# Patient Record
Sex: FEMALE | Race: WHITE | Employment: FULL TIME | URBAN - METROPOLITAN AREA
[De-identification: names, ages, dates, MRNs, and addresses within clinical notes are randomized per-mention and may not be internally consistent; named-entity substitution may affect disease eponyms.]

---

## 2017-08-29 ENCOUNTER — TELEPHONE (OUTPATIENT)
Dept: SURGERY | Age: 42
End: 2017-08-29

## 2019-07-30 ENCOUNTER — HOSPITAL ENCOUNTER (OUTPATIENT)
Dept: LAB | Age: 44
Discharge: HOME | End: 2019-07-30
Payer: OTHER GOVERNMENT

## 2019-07-30 ENCOUNTER — OFFICE VISIT (OUTPATIENT)
Dept: ONCOLOGY | Age: 44
End: 2019-07-30

## 2019-07-30 VITALS
OXYGEN SATURATION: 95 % | DIASTOLIC BLOOD PRESSURE: 75 MMHG | WEIGHT: 293 LBS | TEMPERATURE: 97.2 F | RESPIRATION RATE: 20 BRPM | SYSTOLIC BLOOD PRESSURE: 118 MMHG | HEART RATE: 86 BPM

## 2019-07-30 DIAGNOSIS — E61.1 IRON DEFICIENCY: ICD-10-CM

## 2019-07-30 DIAGNOSIS — D75.839 THROMBOCYTOSIS: Primary | ICD-10-CM

## 2019-07-30 LAB
ALBUMIN SERPL-MCNC: 3.7 G/DL (ref 3.4–5)
ALBUMIN/GLOB SERPL: 1.1 {RATIO} (ref 0.8–1.7)
ALP SERPL-CCNC: 93 U/L (ref 45–117)
ALT SERPL-CCNC: 34 U/L (ref 13–56)
ANION GAP SERPL CALC-SCNC: 8 MMOL/L (ref 3–18)
AST SERPL-CCNC: 21 U/L (ref 10–38)
BASOPHILS # BLD: 0 K/UL (ref 0–0.1)
BASOPHILS NFR BLD: 0 % (ref 0–2)
BILIRUB SERPL-MCNC: 0.8 MG/DL (ref 0.2–1)
BUN SERPL-MCNC: 18 MG/DL (ref 7–18)
BUN/CREAT SERPL: 18 (ref 12–20)
CALCIUM SERPL-MCNC: 9.3 MG/DL (ref 8.5–10.1)
CHLORIDE SERPL-SCNC: 97 MMOL/L (ref 100–111)
CO2 SERPL-SCNC: 30 MMOL/L (ref 21–32)
CREAT SERPL-MCNC: 0.99 MG/DL (ref 0.6–1.3)
CRP SERPL-MCNC: 2.1 MG/DL (ref 0–0.3)
DIFFERENTIAL METHOD BLD: NORMAL
EOSINOPHIL # BLD: 0.2 K/UL (ref 0–0.4)
EOSINOPHIL NFR BLD: 2 % (ref 0–5)
ERYTHROCYTE [DISTWIDTH] IN BLOOD BY AUTOMATED COUNT: 13.1 % (ref 11.6–14.5)
ERYTHROCYTE [SEDIMENTATION RATE] IN BLOOD: 24 MM/HR (ref 0–20)
FERRITIN SERPL-MCNC: 82 NG/ML (ref 8–388)
GLOBULIN SER CALC-MCNC: 3.5 G/DL (ref 2–4)
GLUCOSE SERPL-MCNC: 107 MG/DL (ref 74–99)
HCT VFR BLD AUTO: 42.7 % (ref 35–45)
HGB BLD-MCNC: 14.1 G/DL (ref 12–16)
IRON SATN MFR SERPL: 13 %
IRON SERPL-MCNC: 61 UG/DL (ref 50–175)
LYMPHOCYTES # BLD: 2.7 K/UL (ref 0.9–3.6)
LYMPHOCYTES NFR BLD: 24 % (ref 21–52)
MCH RBC QN AUTO: 29 PG (ref 24–34)
MCHC RBC AUTO-ENTMCNC: 33 G/DL (ref 31–37)
MCV RBC AUTO: 87.9 FL (ref 74–97)
MONOCYTES # BLD: 0.7 K/UL (ref 0.05–1.2)
MONOCYTES NFR BLD: 6 % (ref 3–10)
NEUTS SEG # BLD: 7.6 K/UL (ref 1.8–8)
NEUTS SEG NFR BLD: 68 % (ref 40–73)
PLATELET # BLD AUTO: 413 K/UL (ref 135–420)
PMV BLD AUTO: 9.6 FL (ref 9.2–11.8)
POTASSIUM SERPL-SCNC: 4.3 MMOL/L (ref 3.5–5.5)
PROT SERPL-MCNC: 7.2 G/DL (ref 6.4–8.2)
RBC # BLD AUTO: 4.86 M/UL (ref 4.2–5.3)
SODIUM SERPL-SCNC: 135 MMOL/L (ref 136–145)
TIBC SERPL-MCNC: 467 UG/DL (ref 250–450)
WBC # BLD AUTO: 11.2 K/UL (ref 4.6–13.2)

## 2019-07-30 PROCEDURE — 36415 COLL VENOUS BLD VENIPUNCTURE: CPT

## 2019-07-30 PROCEDURE — 83540 ASSAY OF IRON: CPT

## 2019-07-30 PROCEDURE — 85652 RBC SED RATE AUTOMATED: CPT

## 2019-07-30 PROCEDURE — 86140 C-REACTIVE PROTEIN: CPT

## 2019-07-30 PROCEDURE — 80053 COMPREHEN METABOLIC PANEL: CPT

## 2019-07-30 PROCEDURE — 81270 JAK2 GENE: CPT

## 2019-07-30 PROCEDURE — 81402 MOPATH PROCEDURE LEVEL 3: CPT

## 2019-07-30 PROCEDURE — 81219 CALR GENE COM VARIANTS: CPT

## 2019-07-30 PROCEDURE — 85025 COMPLETE CBC W/AUTO DIFF WBC: CPT

## 2019-07-30 PROCEDURE — 82728 ASSAY OF FERRITIN: CPT

## 2019-07-30 RX ORDER — CARISOPRODOL 350 MG/1
350 TABLET ORAL 4 TIMES DAILY
COMMUNITY
End: 2019-08-20

## 2019-07-30 RX ORDER — OXYCODONE AND ACETAMINOPHEN 7.5; 325 MG/1; MG/1
TABLET ORAL
COMMUNITY
End: 2019-08-20

## 2019-07-30 RX ORDER — IBUPROFEN 800 MG/1
TABLET ORAL
COMMUNITY
End: 2019-08-20

## 2019-07-30 RX ORDER — PRAVASTATIN SODIUM 40 MG/1
40 TABLET ORAL
COMMUNITY
End: 2019-08-20

## 2019-07-30 RX ORDER — DULOXETIN HYDROCHLORIDE 60 MG/1
60 CAPSULE, DELAYED RELEASE ORAL DAILY
COMMUNITY

## 2019-07-30 NOTE — PROGRESS NOTES
Thien Chaves is a 41 y.o. female presenting today for a new patient appointment r/t thrombocytosis. Patient is ambulatory with no assistive devices and complains of pain 7/10. Chief Complaint   Patient presents with    Thrombocytosis     new patient       Visit Vitals  /75 (BP 1 Location: Left arm, BP Patient Position: Sitting)   Pulse 86   Temp 97.2 °F (36.2 °C) (Oral)   Resp 20   Wt 134.7 kg (297 lb)   SpO2 95%       Current Outpatient Medications   Medication Sig    oxyCODONE-acetaminophen (PERCOCET 7.5) 7.5-325 mg per tablet Take  by mouth.  carisoprodol (SOMA) 350 mg tablet Take 350 mg by mouth four (4) times daily.  pravastatin (PRAVACHOL) 40 mg tablet Take 40 mg by mouth nightly.  DULoxetine (CYMBALTA) 60 mg capsule Take 60 mg by mouth daily.  ibuprofen (MOTRIN) 800 mg tablet Take  by mouth. No current facility-administered medications for this visit. Medications no longer taking/discontinued: none    No flowsheet data found. 3 most recent PHQ Screens 7/30/2019   PHQ Not Done Active Diagnosis of Depression or Bipolar Disorder       No flowsheet data found.     Learning Assessment 7/30/2019   PRIMARY LEARNER Patient   HIGHEST LEVEL OF EDUCATION - PRIMARY LEARNER  GRADUATED HIGH SCHOOL OR GED   PRIMARY LANGUAGE ENGLISH   LEARNER PREFERENCE PRIMARY READING     LISTENING   ANSWERED BY self   RELATIONSHIP SELF

## 2019-08-02 LAB
BACKGROUND: 489207: NORMAL
DIRECTOR REVIEW: 489204: NORMAL
JAK2 P.V617F BLD/T QL: NORMAL

## 2019-08-08 LAB
Lab: NORMAL
Lab: NORMAL
REFERENCE LAB,REFLB: NORMAL
REFERENCE LAB,REFLB: NORMAL
TEST DESCRIPTION:,ATST: NORMAL
TEST DESCRIPTION:,ATST: NORMAL

## 2019-08-20 ENCOUNTER — HOSPITAL ENCOUNTER (OUTPATIENT)
Dept: INFUSION THERAPY | Age: 44
Discharge: HOME | End: 2019-08-20
Payer: OTHER GOVERNMENT

## 2019-08-20 VITALS
RESPIRATION RATE: 17 BRPM | OXYGEN SATURATION: 97 % | HEART RATE: 72 BPM | SYSTOLIC BLOOD PRESSURE: 148 MMHG | TEMPERATURE: 97.7 F | DIASTOLIC BLOOD PRESSURE: 90 MMHG

## 2019-08-20 PROCEDURE — 74011250636 HC RX REV CODE- 250/636: Performed by: INTERNAL MEDICINE

## 2019-08-20 PROCEDURE — 96372 THER/PROPH/DIAG INJ SC/IM: CPT

## 2019-08-20 PROCEDURE — 96365 THER/PROPH/DIAG IV INF INIT: CPT

## 2019-08-20 RX ORDER — SODIUM CHLORIDE 9 MG/ML
250 INJECTION, SOLUTION INTRAVENOUS ONCE
Status: COMPLETED | OUTPATIENT
Start: 2019-08-20 | End: 2019-08-20

## 2019-08-20 RX ADMIN — SODIUM CHLORIDE 250 ML: 900 INJECTION, SOLUTION INTRAVENOUS at 14:51

## 2019-08-20 RX ADMIN — FERRIC CARBOXYMALTOSE INJECTION 750 MG: 50 INJECTION, SOLUTION INTRAVENOUS at 14:51

## 2019-08-20 NOTE — PROGRESS NOTES
[facility] Progress Note    Date: 2019    Name: Christina Soliz    MRN: [id number]         : 1975    Injectafer Infusion 1 of 2    Ms. Elmer Pantoja to [facility], [facility], at 1430. Pt was assessed and education was provided. Ms. Montes's vitals were reviewed and patient was observed for 5 minutes prior to treatment. Visit Vitals  /90 (BP 1 Location: Left arm, BP Patient Position: Sitting)   Pulse 72   Temp 97.7 °F (36.5 °C)   Resp 17   SpO2 97%   Breastfeeding? No         22g PIV placed in right antecubital x1 attempt. PIV flushed easily and had brisk blood return. Injectafer 750mg in 250mL of NS was infused over 20 minutes per order. Ms. Elmer Pantoja tolerated the infusion, and had no complaints. VS remained stable. PIV flushed with NS 10 ml and removed. No bleeding or hematoma noted at site. Guaze and coban applied. Reviewed discharge instructions with patient, including expected side effects (abdominal cramping, nausea, changes in color of urine or feces) and signs of allergic reaction requiring medical attention (itching/hives/rashes, SOB, chest pain, lip/tongue/facial swelling). Patient given printed copy to take home. Patient verbalized understanding of discharge instructions. Injectafer [employer], signed and scanned into patient chart. Ms. Elmer Pantoja was observed for 30 minutes post-infusion. No S/S of adverse reactions at this time. Patient armband removed and shredded. Ms. Elmer Pantoja was discharged from [place] [address] in stable condition at 1550. She is to return on 19 for next Injectafer infusion.     Raheel Giron RN  2019  1550

## 2019-08-27 ENCOUNTER — OFFICE VISIT (OUTPATIENT)
Dept: ONCOLOGY | Age: 44
End: 2019-08-27

## 2019-08-27 ENCOUNTER — HOSPITAL ENCOUNTER (OUTPATIENT)
Dept: INFUSION THERAPY | Age: 44
Discharge: HOME | End: 2019-08-27
Payer: OTHER GOVERNMENT

## 2019-08-27 VITALS
TEMPERATURE: 97.6 F | SYSTOLIC BLOOD PRESSURE: 115 MMHG | OXYGEN SATURATION: 96 % | DIASTOLIC BLOOD PRESSURE: 69 MMHG | HEART RATE: 94 BPM | RESPIRATION RATE: 18 BRPM

## 2019-08-27 VITALS
HEART RATE: 74 BPM | RESPIRATION RATE: 18 BRPM | DIASTOLIC BLOOD PRESSURE: 78 MMHG | WEIGHT: 293 LBS | SYSTOLIC BLOOD PRESSURE: 129 MMHG | OXYGEN SATURATION: 92 % | BODY MASS INDEX: 51.98 KG/M2 | TEMPERATURE: 97 F

## 2019-08-27 DIAGNOSIS — E61.1 IRON DEFICIENCY: ICD-10-CM

## 2019-08-27 DIAGNOSIS — D75.839 THROMBOCYTOSIS: Primary | ICD-10-CM

## 2019-08-27 PROCEDURE — 74011250636 HC RX REV CODE- 250/636: Performed by: INTERNAL MEDICINE

## 2019-08-27 PROCEDURE — 96365 THER/PROPH/DIAG IV INF INIT: CPT

## 2019-08-27 RX ORDER — SODIUM CHLORIDE 0.9 % (FLUSH) 0.9 %
10-40 SYRINGE (ML) INJECTION AS NEEDED
Status: DISCONTINUED | OUTPATIENT
Start: 2019-08-27 | End: 2019-08-31 | Stop reason: HOSPADM

## 2019-08-27 RX ORDER — SODIUM CHLORIDE 9 MG/ML
25 INJECTION, SOLUTION INTRAVENOUS ONCE
Status: COMPLETED | OUTPATIENT
Start: 2019-08-27 | End: 2019-08-27

## 2019-08-27 RX ADMIN — FERRIC CARBOXYMALTOSE INJECTION 750 MG: 50 INJECTION, SOLUTION INTRAVENOUS at 15:15

## 2019-08-27 RX ADMIN — Medication 10 ML: at 15:38

## 2019-08-27 RX ADMIN — SODIUM CHLORIDE 25 ML/HR: 9 INJECTION, SOLUTION INTRAVENOUS at 15:15

## 2019-08-27 NOTE — PROGRESS NOTES
[facility] Progress Note    Date: 2019    Name: Xander Still    MRN: [id number]         : 1975    Injectafer Infusion 2 of 2    Ms. Carlos Manuel Gong to [facility], ambulatory, at [phone]. Pt was assessed and education was provided. Ms. Montes's vitals were reviewed and patient was observed for 5 minutes prior to treatment. Visit Vitals  /69 (BP 1 Location: Right arm, BP Patient Position: Sitting)   Pulse 94   Temp 97.6 °F (36.4 °C)   Resp 18   SpO2 96%         22g PIV placed in Left antecubital x1 attempt. PIV flushed easily and had brisk blood return. Injectafer 750mg in 250mL of NS was infused over 20 minutes per order. Ms. Carlos Manuel Gong tolerated the infusion, and had no complaints. VS remained stable. PIV flushed with NS 10 ml and removed. No bleeding or hematoma noted at site. Gauze and coban applied. Patient armband removed and shredded. Ms. Carlos Manuel Gong was discharged from [place] [address] in stable condition at [phone]. She is to follow up with physician, today was last dose of Injectafer.     Jr Willoughby RN  2019

## 2019-08-27 NOTE — PROGRESS NOTES
Abdiel Peterson is a 41 y.o. 1975 female and presents with No chief complaint on file. Patient with past medical history including fibromyalgia, hypertension, hyperlipidemia, who I was asked to see in consultation at the request of Dr. Nick Srivastava for evaluation for thrombocytosis. I saw the patient on 7/30/2019, and ordered blood work for work-up, she is here today for follow-up. Labs on 7/30/2019/showed JAK2, MPL and CALR negative, WBC 11.2, H&H 14.1/42.7, MCV 87.9, platelet 305 (323-270). Ferritin 82 and transferrin saturation 13%. BUN and creatinine normal and both sed rate and CRP elevated.     Patient was seen and examined today. She is awake alert oriented x3. On review of systems she denies any B symptoms or lumps and bumps. Reports some joint and muscle pain occasionally from for myalgia 7/10. Denies any epistaxis, hematemesis, or bright red blood per rectum. No hematuria. No menses for 2 years. She quit smoking cigarette in 2010 and only drinks alcohol occasionally. ECOG performance status 0. Independent with ADLs and IADLs. Past Medical History:   Diagnosis Date    Anxiety     Blurred vision, bilateral     Burning with urination     Chest pain     Coughing up blood     one episode    Depression     Diarrhea     Ear ache     Fibromyalgia     Headache     Heartburn     Hypercholesteremia     Hypertension     Joint pain     Muscle ache     Pelvic pain     Recent change in weight     Tiredness     Trouble in sleeping      Past Surgical History:   Procedure Laterality Date    HX TONSILLECTOMY       Social History     Socioeconomic History    Marital status:      Spouse name: Not on file    Number of children: Not on file    Years of education: Not on file    Highest education level: Not on file   Tobacco Use    Smoking status: Former Smoker    Smokeless tobacco: Current User    Tobacco comment: 2010   Substance and Sexual Activity    Alcohol use:  Yes Comment: ocass    Drug use: Never    Sexual activity: Yes   Social History Narrative    ** Merged History Encounter **          Family History   Problem Relation Age of Onset    Diabetes Maternal Grandmother     Heart Disease Maternal Grandmother     Hypertension Maternal Grandmother     Stroke Maternal Grandmother     Diabetes Paternal Grandmother     Diabetes Paternal Grandfather     Heart Disease Paternal Grandfather     Hypertension Paternal Grandfather     Stroke Paternal Grandfather        Current Outpatient Medications   Medication Sig Dispense Refill    DULoxetine (CYMBALTA) 60 mg capsule Take 60 mg by mouth daily.  carisoprodol (SOMA) 350 mg tablet Take 350 mg by mouth two (2) times a day.  oxyCODONE-acetaminophen (PERCOCET) 7.5-325 mg per tablet Take  by mouth every four (4) hours as needed.  ibuprofen (MOTRIN) 800 mg tablet Take  by mouth.  pravastatin (PRAVACHOL) 10 mg tablet Take  by mouth nightly. Allergies   Allergen Reactions    Pcn [Penicillins] Other (comments)     hives    Penicillins Hives    Wellbutrin [Bupropion Hcl] Other (comments)     hives    Wellbutrin [Bupropion] Hives       Review of Systems    Denies any fever, chills, SOB, NV or abd. Pain. Clinically doing well. Remaining of 12 points of comprehensive review of systems was negative.     Objective:  Visit Vitals  /78   Pulse 74   Temp 97 °F (36.1 °C) (Oral)   Resp 18   Wt 137.3 kg (302 lb 12.8 oz)   SpO2 92%   BMI 51.98 kg/m²         Physical Exam:   General appearance - alert, well appearing, and in no distress  Mental status - alert, oriented to person, place, and time  EYE-OZ, EOMI  ENT-ENT exam normal, no neck nodes or sinus tenderness  Mouth - mucous membranes moist, pharynx normal without lesions  Neck - supple, no significant adenopathy   Chest - clear to auscultation, no wheezes, rales or rhonchi, symmetric air entry   Heart - normal rate and regular rhythm   Abdomen - soft, nontender, nondistended, no masses or organomegaly  Lymph- no adenopathy palpable  Ext-no pedal edema noted  Skin-Warm and dry. Neuro -alert, oriented, normal speech, no focal findings or movement disorder noted      Diagnostic Imaging     No results found for this or any previous visit. Results for orders placed during the hospital encounter of 11/28/12   XR ABD ACUTE W 1 V CHEST    Narrative History: abdominal pain    Abdominal series:    Flat and upright views of the abdomen demonstrate normal bowel gas pattern  without obstruction or free air. No abnormal mass or suspicious calcification  is seen. Bony structures appear unremarkable. Single accompanying frontal chest demonstrates no focal parenchymal or pleural  abnormality. Mild degenerative changes noted in the spine. Impression IMPRESSION:    No evidence of an acute abdominal or cardiopulmonary process. Results for orders placed during the hospital encounter of 11/22/12   CT ABD PELV W CONT    Narrative CT abdomen and Pelvis    Clinical Indication:  Abdominal pain. Volumetrically acquired images of the abdomen  and pelvis were obtained after  the use of oral and intravenous contrast material. 3.75 mm axial scans were  obtained with sagittal and coronal reconstructions No prior studies are  available for comparison. A Stat wet reading had previously been provided at the time of service by the  on call resident. After hours stat wet reading had previously been provided at  the time of service by [facility]    Lung bases:   Clear    Abdominal wall:  Unremarkable    Liver Gallbladder spleen:  Normal liver and spleen. Normal gallbladder without  stones    Pancreas and adrenals:  Hypodense right adrenal mass measures 3.1 x 2.4 cm. Normal left adrenal and pancreas    Kidneys, ureters and bladder:  Kidneys are unobstructed and without stones.   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 Retroperitoneum:  Normal abdominal aorta. No retroperitoneal adenopathy    Gastrointestinal tract:  No bowel obstruction, free air or inflammatory bowel  process. Small air-filled hiatal hernia. Normal appendix. CT Pelvis:    Pelvic Organs:  Normal urinary bladder and uterus. Bilateral tubular structures  in the adnexa may represent hydrosalpinx or PID. Lymph Nodes and Retroperitoneum:  Unremarkable    Bowel:  Normal rectosigmoid    Other:  No ascites    Bone windows:  Normal lumbar alignment. No compression fractures          Impression Impression:  1. Dilated fluid-filled cystic/ tubular structures in the adnexa bilaterally  may represent hydrosalpinx, PID or right tubo-ovarian abscess. . Findings are  more prominent on the right. This could be further evaluated with pelvic  ultrasound. 2. Indeterminate 3.1 cm x 2.4 cm hypodense right adrenal nodule. This could be  further evaluated with dedicated adrenal CT. 3. Subcentimeter fat density left renal cortex probably represents  angiomyolipoma  4. Normal gallbladder and appendix  5. No bowel obstruction or free air       Lab Results  Lab Results   Component Value Date/Time    WBC 11.2 07/30/2019 04:08 PM    HGB 14.1 07/30/2019 04:08 PM    HCT 42.7 07/30/2019 04:08 PM    PLATELET 834 85/13/0626 04:08 PM    MCV 87.9 07/30/2019 04:08 PM       Lab Results   Component Value Date/Time    Sodium 135 (L) 07/30/2019 04:08 PM    Potassium 4.3 07/30/2019 04:08 PM    Chloride 97 (L) 07/30/2019 04:08 PM    CO2 30 07/30/2019 04:08 PM    Anion gap 8 07/30/2019 04:08 PM    Glucose 107 (H) 07/30/2019 04:08 PM    BUN 18 07/30/2019 04:08 PM    Creatinine 0.99 07/30/2019 04:08 PM    BUN/Creatinine ratio 18 07/30/2019 04:08 PM    GFR est AA >60 07/30/2019 04:08 PM    GFR est non-AA >60 07/30/2019 04:08 PM    Calcium 9.3 07/30/2019 04:08 PM    AST (SGOT) 21 07/30/2019 04:08 PM    Alk.  phosphatase 93 07/30/2019 04:08 PM    Protein, total 7.2 07/30/2019 04:08 PM    Albumin 3.7 07/30/2019 04:08 PM    Globulin 3.5 07/30/2019 04:08 PM    A-G Ratio 1.1 07/30/2019 04:08 PM    ALT (SGPT) 34 07/30/2019 04:08 PM       Assessment/Plan:  1. Thrombocytosis ([address]  Resolved as of repeat CBC on 7/30/2019. JAK2, MPL and CALR are all 3 negative. Her thrombocytosis is likely therefore not related to myeloproliferative neoplasm. Rather thrombocytosis might have been related to iron deficiency. 2. Iron deficiency  I think Ms. Montes's  ferritin is higher due to acute phase reactant since the bolus ESR and CRP are elevated. Patient certainly has iron deficiency however her erythropoiesis is not restricted yet by her deficiency. She got IV iron x1 and second dose is scheduled today.      RTC in 3 months with repeat labs        Pocne Hu MD

## 2019-08-27 NOTE — PROGRESS NOTES
Ta Welch is a 41 y.o. female presenting today for a follow-up appointment. Patient is ambulatory with no assistive devices and denies any complaints. Chief Complaint   Patient presents with    Abnormal Lab Results     Iron deficiency anemia and thrombocytosis       Visit Vitals  /78   Pulse 74   Temp 97 °F (36.1 °C) (Oral)   Resp 18   Wt 302 lb 12.8 oz (137.3 kg)   SpO2 92%   BMI 51.98 kg/m²       Current Outpatient Medications   Medication Sig    DULoxetine (CYMBALTA) 60 mg capsule Take 60 mg by mouth daily.  carisoprodol (SOMA) 350 mg tablet Take 350 mg by mouth two (2) times a day.  oxyCODONE-acetaminophen (PERCOCET) 7.5-325 mg per tablet Take  by mouth every four (4) hours as needed.  ibuprofen (MOTRIN) 800 mg tablet Take  by mouth.  pravastatin (PRAVACHOL) 10 mg tablet Take  by mouth nightly. No current facility-administered medications for this visit. Facility-Administered Medications Ordered in Other Visits   Medication Dose Route Frequency    sodium chloride (NS) flush 10-40 mL  10-40 mL IntraVENous PRN           No flowsheet data found. 3 most recent PHQ Screens 7/30/2019   PHQ Not Done Active Diagnosis of Depression or Bipolar Disorder       No flowsheet data found. 1. Have you been to the ER, urgent care clinic since your last visit? Hospitalized since your last visit? No    2. Have you seen or consulted any other health care providers outside of the [address] [place] [address] since your last visit? Include any pap smears or colon screening.  No

## 2019-10-08 ENCOUNTER — OFFICE VISIT (OUTPATIENT)
Dept: SURGERY | Age: 44
End: 2019-10-08

## 2019-10-08 VITALS
HEIGHT: 64 IN | WEIGHT: 293 LBS | TEMPERATURE: 97.9 F | SYSTOLIC BLOOD PRESSURE: 156 MMHG | HEART RATE: 91 BPM | BODY MASS INDEX: 50.02 KG/M2 | OXYGEN SATURATION: 94 % | DIASTOLIC BLOOD PRESSURE: 85 MMHG

## 2019-10-08 DIAGNOSIS — E11.9 TYPE 2 DIABETES MELLITUS WITHOUT COMPLICATION, UNSPECIFIED WHETHER LONG TERM INSULIN USE (HCC): ICD-10-CM

## 2019-10-08 DIAGNOSIS — I10 ESSENTIAL HYPERTENSION, BENIGN: ICD-10-CM

## 2019-10-08 DIAGNOSIS — E66.01 OBESITY, MORBID (HCC): Primary | ICD-10-CM

## 2019-10-08 DIAGNOSIS — R73.03 PREDIABETES: ICD-10-CM

## 2019-10-08 DIAGNOSIS — G47.33 OBSTRUCTIVE SLEEP APNEA (ADULT) (PEDIATRIC): ICD-10-CM

## 2019-10-08 RX ORDER — SPIRONOLACTONE 25 MG/1
TABLET ORAL DAILY
COMMUNITY
End: 2020-02-11

## 2019-10-08 NOTE — PATIENT INSTRUCTIONS
If you have and questions or concerns about today's appointment, the verbal and/or written instructions you were given for follow up care, please call our office at [phone].      [facility] - [address]  Office: [phone]   Fax: [phone]

## 2019-10-08 NOTE — PROGRESS NOTES
tial Consultation for Bariatric Surgery Template     Nhung Franklin is a 24-year-old white female who presents for discussion of surgical options available for the definitive management of her super obesity. Onset of obesity: Childhood. Weight at age 27: 165 pounds on a 5 performed frame. Current/maximum weight: 313 pounds on a 5 foot 4 extremities body mass index of 54  Pattern/progression of weight gain: Slowly progressive interrupted by dietary weight loss followed by regain of lost weight as well as additional weight thus exhibiting the yo-yo effect after current maximum weight of 313 pounds. Maximum medical weight loss attempts: Multiple supervised and and supervised weight loss trials of maximal loss utilizing weight watchers in 2018 losing 40 pounds over 3 months. Comorbidities: Hypertension, prediabetes, cholesterolemia, obstructive sleep apnea/BiPAP, weight related arthropathy-back, hips, knees. Current weight: 313 BMI: 54. Ideal body weight: 131. Excess body weight: 182. Estimated postsurgical weight loss: 146. Postsurgical goal weight: 167. Allergies: Penicillin, Wellbutrin  Current medications: See medication list  Past medical history:  1. Super obesity with body mass index of 54 and obesity related comorbidities of hypertension, prediabetes, hypercholesterolemia, obstructive sleep apnea/BiPAP, weight related arthropathy-back, hips, knees  2. History of thrombocytosis. 3.  Iron deficiency anemia. 4.  Fibromyalgia. 5.  Anxiety/depression  Past surgical history:  1. Tonsillectomy age 17. Social history:  Tobacco: Quit smoking  with a prior history of 1 pack of cigarettes daily x14 years  Alcohol: 1 ounce monthly. Family history:  Father 66-healthy  Mother  53-coronary artery disease  Brothers x2 ages 35, 39-healthy  Past medical history:       Allergies   Allergen Reactions    Pcn [Penicillins] Other (comments)     hives    Penicillins Hives    Wellbutrin [Bupropion Hcl] Other (comments)     hives    Wellbutrin [Bupropion] Hives       Current Outpatient Medications   Medication Sig Dispense Refill    spironolactone (ALDACTONE) 25 mg tablet Take  by mouth daily.  DULoxetine (CYMBALTA) 60 mg capsule Take 60 mg by mouth daily.  carisoprodol (SOMA) 350 mg tablet Take 350 mg by mouth two (2) times a day.  oxyCODONE-acetaminophen (PERCOCET) 7.5-325 mg per tablet Take  by mouth every four (4) hours as needed.  ibuprofen (MOTRIN) 800 mg tablet Take  by mouth.  pravastatin (PRAVACHOL) 10 mg tablet Take  by mouth nightly.            Past Medical History:   Diagnosis Date    Anxiety     Blurred vision, bilateral     Burning with urination     Chest pain     Coughing up blood     one episode    Depression     Diarrhea     Ear ache     Fibromyalgia     Fibromyalgia     Headache     Heartburn     Hypercholesteremia     Hypertension     Joint pain     Muscle ache     Pelvic pain     Pre-diabetes     Recent change in weight     Sleep apnea     Tiredness     Trouble in sleeping        Past Surgical History:   Procedure Laterality Date    HX TONSILLECTOMY         Social History     Tobacco Use    Smoking status: Former Smoker    Smokeless tobacco: Never Used    Tobacco comment:    Substance Use Topics    Alcohol use: Not Currently     Comment: ocass    Drug use: Never       Family History   Problem Relation Age of Onset    Diabetes Maternal Grandmother     Heart Disease Maternal Grandmother     Hypertension Maternal Grandmother     Stroke Maternal Grandmother     Diabetes Paternal Grandmother     Diabetes Paternal Grandfather     Heart Disease Paternal Grandfather     Hypertension Paternal Grandfather     Stroke Paternal Grandfather     Heart Disease Mother        Family Status   Relation Name Status    MGM      PGM  Alive    PGF  (Not Specified)    Mother      Father  Alive       Review of Systems: General: Denies fevers, chills, night sweats, fatigue, weight loss, or weight gain. HEENT: Denies changes in auditory or visual acuity, recurrent pharyngitis, epistaxis, chronic rhinorrhea, vertigo    Respiratory: Denies increasing shortness of breath, productive cough, hemoptysis    Cardiac: Denies known history of cardiac disease, heart murmur, palpitations    GI: Denies dysphagia, recurrent emesis, hematemesis, changes in bowel habits, hematochezia, melena    : Denies hematuria frequency urgency dysuria    Musculoskeletal: Denies fractures, dislocations    Neurologic: Denies history of CVA, paralysis paresthesias, recurrent cephalgia, seizures    Endocrine: Denies polyuria, polydipsia, polyphagia, heat and cold intolerance    Lymph/heme: Denies a history of malignancy, anemia, bruising, blood transfusions    Integumentary: Negative for dermatitis         Physical Exam    Visit Vitals  /85 (BP 1 Location: Right arm, BP Patient Position: Sitting)   Pulse 91   Temp 97.9 °F (36.6 °C) (Oral)   Ht 5' 4\" (1.626 m)   Wt 142.2 kg (313 lb 6.4 oz)   LMP  (LMP Unknown)   SpO2 94%   BMI 53.79 kg/m²       Pre op weight: 313.4  EBW: 182.4  Wt loss to date: 0     General: Clinically severely obese in no acute distress, nontoxic in appearance. Head: Normocephalic, atraumatic  Mouth: Clear, no overt lesions, oral mucosa is pink and moist.  Neck: Supple, no masses, no adenopathy or carotid bruits, trachea midline  Resp: Clear to auscultation bilaterally, no wheezing, rhonchi, or rales, excursions normal and symmetrical.  Cardio: Regular rate and rhythm, no murmurs, clicks, gallops, or rubs. Abdomen: Obese, soft, nontender, nondistended, normoactive bowel sounds, no hernias.   Extremities: Warm, well perfused, no tenderness or swelling, normal gait/station, without edema or varicosities  Neuro: Sensation and strength grossly intact and symmetrical.  Psych: Alert and oriented to person, place, and time.    Impression:       62-year-old white female with a body mass index of 54 and obesity related comorbidities of hypertension, prediabetes, hypercholesterolemia, obstructive sleep apnea/BiPAP, weight related arthropathy-back, hips, knees who would benefit from bariatric surgery. We have had an extensive discussion with regard to the risks, benefits and likely outcomes of the operation. We've discussed the restrictive and malabsorptive nature of the gastric bypass and compared and contrasted with the sleeve gastrectomy. The patient understands the likelihood of losing approximately 80% of their excess weight in 12 to 18 months. The patient also understands the risks including but not limited to bleeding, infection, need for reoperation, ulcers, leaks and strictures, bowel obstruction secondary to adhesions and internal hernias, DVT, PE, heart attack, stroke, and death. Patient also understands risks of inadequate weight loss, excess weight loss, vitamin insufficiency, protein malnutrition, excess skin, and loss of hair. We have reviewed the components of a successful postoperative course including requirement for a high protein, low carbohydrate diet, 60 oz a day of zero calorie liquids, daily vitamin supplementation, daily exercise, regular follow-up, and participation in support groups.  At this time we will enroll the patient in our bariatric program, undertake routine laboratory evaluation, chest X-ray, EKG, possible UGI and evaluation by  nutritionist as well as psychologist and pending their satisfactory completion of the preop evaluation, plan to pursue laparoscopic potentially open gastric bypass to achieve definitive durable weight loss on a personal level with expected resolution of obesity related comorbidities

## 2019-10-08 NOTE — PROGRESS NOTES
Ap Diggs is a 41 y.o. female (: 1975) presenting to address:    Chief Complaint   Patient presents with    Weight Management     Self-ref GBP consult       Medication list and allergies have been reviewed with Ap Diggs and updated as of today's date. I have gone over all Medical, Surgical and Social History with Ap Diggs and updated/added the information accordingly.

## 2019-10-30 ENCOUNTER — DOCUMENTATION ONLY (OUTPATIENT)
Dept: SURGERY | Age: 44
End: 2019-10-30

## 2019-10-30 NOTE — PROGRESS NOTES
[facility] [place] [facility]  [id number] [place] [address], [address] [place] [address]    Patient's Name: Shantanu Candelaria   Age: 41 y.o. YOB: 1975   Sex: female    Date:  10/23/2019    Session: 1 of  3  Surgeon:  Dr. Cecilia Deleon    Height: 5'4\" Weight:    314      Lbs. BMI: 53     Starting Weight: 313       Lbs. Do you smoke? no    Alcohol intake:    Number of drinks at a time:  1-3 times a month    Class Guidelines    Guidelines are reviewed with patient at the start of every class. 1. Patient understands that weight loss trial classes must be consecutive. Patient understands if they miss a class, it is their responsibility to contact me to reschedule class. I will reach out to patient after their first no show. 2.  Patient understands the expectations that weight maintenance/weight loss is expected during the classes. Failure to demonstrate changes may result in one extra month of weight loss trial, followed by going back to see the surgeon. 3. Patient is also instructed to be doing their labs, blood work, psych visit, support group and any other test that the surgeon has used while they are working on their weight loss trial.  4. Patient is instructed to bring their education binder to all classes. Changes Made Since Last Class:   Less soda, mentally excited for surgery. Eating Habits and Behaviors      Today we reviewed key diet principles. We talked about patient following a low calorie/low carbohydrate diet while they are in weight loss trials. To achieve this, patient is encouraged to avoid liquid calories, including alcohol, soda, sweet tea, and fruit juices. Patient can cut carbohydrates by trying to stick to meat and vegetables. Patient can also eat eggs, cheese, and good fat, while trying to eliminate starches, such as pasta, rice, crackers, chips, popcorn.     Some of the food-related suggestions included drinking plenty of water or calorie-free beverages prior to their meal.  Patient is encouraged to to fill up on protein first, which is the ultimate fill-me up food. We talked about the importance of eating breakfast and the effects that can happen if one skips meals, which includes eating larger portions, snacking more, and decreasing their metabolism. With the suggestions in the power point, patient will be able to decrease their calories and carbohydrate intake. Patient's current diet habits include:   1-2 meals consisting of meat, vegetables and a starch. Physical Activity/Exercise    Comments: We talked about the importance of establishing a work out routine. Behavior Modification       Comments:   Some of the behavior tips that were included in the power point, include being choosy about night time snacking. Patient was encouraged to make the TV a no eating zone and not eat after 7 pm.  Patient is also encouraged to keep a food journal.      I also reminded all patients to make their psychologist appointment and attend at least 1 support group. Goals for the next month include: Increase activity to 4 days a week. Decrease portion size and decrease sugar.     Gabo Villavicencio RD

## 2019-11-11 ENCOUNTER — TELEPHONE (OUTPATIENT)
Dept: SURGERY | Age: 44
End: 2019-11-11

## 2019-11-11 DIAGNOSIS — Z01.818 PRE-OP TESTING: ICD-10-CM

## 2019-11-11 DIAGNOSIS — E66.01 OBESITY, MORBID (HCC): Primary | ICD-10-CM

## 2019-11-12 ENCOUNTER — OFFICE VISIT (OUTPATIENT)
Dept: SURGERY | Age: 44
End: 2019-11-12

## 2019-11-12 VITALS
OXYGEN SATURATION: 91 % | DIASTOLIC BLOOD PRESSURE: 72 MMHG | WEIGHT: 293 LBS | HEART RATE: 74 BPM | BODY MASS INDEX: 50.02 KG/M2 | HEIGHT: 64 IN | SYSTOLIC BLOOD PRESSURE: 137 MMHG | TEMPERATURE: 97.7 F

## 2019-11-12 DIAGNOSIS — I10 ESSENTIAL HYPERTENSION: ICD-10-CM

## 2019-11-12 DIAGNOSIS — E66.01 MORBID OBESITY (HCC): Primary | ICD-10-CM

## 2019-11-12 DIAGNOSIS — G89.29 OTHER CHRONIC PAIN: ICD-10-CM

## 2019-11-12 DIAGNOSIS — M79.7 FIBROMYALGIA: ICD-10-CM

## 2019-11-12 NOTE — PROGRESS NOTES
Kavya Johnson is a 41 y.o. female (: 1975) presenting to address:    Chief Complaint   Patient presents with    Other     LGBP/ Midtrial       Medication list and allergies have been reviewed with Kavya Alex and updated as of today's date. I have gone over all Medical, Surgical and Social History with Kavya Johnson and updated/added the information accordingly. 1. Have you been to the ER, Urgent Care or Hospitalized since your last visit? NO      2. Have you followed up with your PCP or any other Physicians since your procedure/ last office visit?    NO

## 2019-11-12 NOTE — PROGRESS NOTES
Bariatric Preoperative Progress Note    Subjective:     Kavya Johnson is a 41 y.o. female who presents today for followup of their candidacy for bariatric surgery. Since last seen, Kavya Johnson has been working through bariatric program towards gastric bypass. Past Medical History:   Diagnosis Date    Anxiety     Blurred vision, bilateral     Burning with urination     Chest pain     Coughing up blood     one episode    Depression     Diarrhea     Ear ache     Fibromyalgia     Fibromyalgia     Headache     Heartburn     Hypercholesteremia     Hypertension     Joint pain     Muscle ache     Pelvic pain     Pre-diabetes     Recent change in weight     Sleep apnea     Tiredness     Trouble in sleeping        Past Surgical History:   Procedure Laterality Date    HX TONSILLECTOMY         Current Outpatient Medications   Medication Sig Dispense Refill    DULoxetine (CYMBALTA) 60 mg capsule Take 60 mg by mouth daily.  carisoprodol (SOMA) 350 mg tablet Take 350 mg by mouth two (2) times a day.  oxyCODONE-acetaminophen (PERCOCET) 7.5-325 mg per tablet Take  by mouth every four (4) hours as needed.  ibuprofen (MOTRIN) 800 mg tablet Take  by mouth.  pravastatin (PRAVACHOL) 10 mg tablet Take  by mouth nightly.  spironolactone (ALDACTONE) 25 mg tablet Take  by mouth daily.          Allergies   Allergen Reactions    Pcn [Penicillins] Other (comments)     hives    Penicillins Hives    Wellbutrin [Bupropion Hcl] Other (comments)     hives    Wellbutrin [Bupropion] Hives       ROS:  Review of Systems:  Positives in [place]    CONST: Fever, weight loss, fatigue or chills  GI: Nausea, vomiting, abdominal pain, change in bowel habits, hematochezia, melena, and GERD   INTEG: Dermatitis, abnormal moles  HEENT: Recent changes in vision, vertigo, epistaxis, dysphagia and hoarseness  CV: Chest pain, palpitations, HTN, edema and varicosities  RESP: Cough, shortness of breath, wheezing, hemoptysis, snoring and reactive airway disease  : Hematuria, dysuria, frequency, urgency, nocturia and stress urinary incontinence   MS: Weakness, joint pain and arthritis, chronic pain- fibromyalgia   ENDO: Diabetes, thyroid disease, polyuria, polydipsia, polyphagia, poor wound healing, heat intolerance, cold intolerance  LYMPH/HEME: Anemia, bruising and history of blood transfusions  NEURO: Dizziness, headache, fainting, seizures and stroke  PSYCH: Anxiety and depression    Remainder of ROS as per HPI. Objective:     Physical Exam:  Visit Vitals  /72 (BP 1 Location: Right arm, BP Patient Position: Sitting)   Pulse 74   Temp 97.7 °F (36.5 °C) (Oral)   Ht 5' 4\" (1.626 m)   Wt 143.3 kg (316 lb)   SpO2 91%   BMI 54.24 kg/m²         General: AAOX3, pleasant and cooperative to exam. Appropriately groomed. NAD. Non-toxic in appearance. Appears stated age. HEENT: Normocephalic, atraumatic, Pupils equal and reactive, nasopharynx clear, oropharynx clear and moist without lesions  NECK: Supple, no lymphadenopathy, thyromegaly, carotid bruits or jugular venous distension. trachea midline  RESP: Clear to auscultation bilaterally, no wheezes, rhonchi, or rales, normal respiratory excursion  CV: Regular rate and rhythm, no murmurs, rubs or gallops. 3+/4 pulses in bilateral dorsalis pedis and posterior tibialis. No distal edema or varicosities. ABD: Soft, nontender, nondistended, normoactive bowel sounds, no hernias, no hepatosplenomegaly  Extremities: Warm, well perfused, no tenderness or swelling, normal gait/station  Neuro: Sensation and strength grossly intact and symmetrical  Psych: Alert and oriented to person, place, and time. Studies to date:    Labs: pending     EKG: pending     Nutritional evaluation: 1/3 completed. Psychiatric evaluation: approved for bariatric surgery     Support Group: needs to attend     Additional evaluations: RUQ US needs to be completed.  Sleep study completed. Patient to obtain report and bring with her to next visit. Assessment:   Migel Mtz is a 41 y.o. female who is progressing through the bariatric preoperative evaluation. At this time, they are an appropriate candidate for weight loss surgery. Ms. Sylvia Gallegos has a reminder for a \"due or due soon\" health maintenance. I have asked that she contact her primary care provider for follow-up on this health maintenance. Plan:   -complete remainder of preop evaluation including EKG, labs, RUQ US, nutrition classes, support group   -Patient voices understanding that weight gain during weight loss trial may result in cancellation of weight loss surgery.   -Follow up once has completed entirety of weight loss workup to determine next steps. - F/U in 1 month       >50% of 25 minute visit spent face to face time with Dixon Basket consisted of counseling & coordinating and/or discussing treatment plans in reference to her There were no encounter diagnoses.       Kameron Gonzalez, MANJUP-BC

## 2019-11-19 ENCOUNTER — HOSPITAL ENCOUNTER (OUTPATIENT)
Dept: PREADMISSION TESTING | Age: 44
Discharge: HOME | End: 2019-11-19
Payer: OTHER GOVERNMENT

## 2019-11-19 ENCOUNTER — HOSPITAL ENCOUNTER (OUTPATIENT)
Dept: GENERAL RADIOLOGY | Age: 44
Discharge: HOME | End: 2019-11-19
Attending: NURSE PRACTITIONER
Payer: OTHER GOVERNMENT

## 2019-11-19 ENCOUNTER — HOSPITAL ENCOUNTER (OUTPATIENT)
Dept: ULTRASOUND IMAGING | Age: 44
Discharge: HOME | End: 2019-11-19
Attending: NURSE PRACTITIONER
Payer: OTHER GOVERNMENT

## 2019-11-19 ENCOUNTER — HOSPITAL ENCOUNTER (OUTPATIENT)
Dept: LAB | Age: 44
Discharge: HOME | End: 2019-11-19
Payer: OTHER GOVERNMENT

## 2019-11-19 DIAGNOSIS — E66.01 OBESITY, MORBID (HCC): ICD-10-CM

## 2019-11-19 DIAGNOSIS — Z01.818 PRE-OP TESTING: ICD-10-CM

## 2019-11-19 LAB
25(OH)D3 SERPL-MCNC: 23.9 NG/ML (ref 30–100)
ALBUMIN SERPL-MCNC: 3.6 G/DL (ref 3.4–5)
ALBUMIN/GLOB SERPL: 1.1 {RATIO} (ref 0.8–1.7)
ALP SERPL-CCNC: 79 U/L (ref 45–117)
ALT SERPL-CCNC: 30 U/L (ref 13–56)
ANION GAP SERPL CALC-SCNC: 6 MMOL/L (ref 3–18)
APPEARANCE UR: CLEAR
AST SERPL-CCNC: 16 U/L (ref 10–38)
ATRIAL RATE: 69 BPM
BACTERIA URNS QL MICRO: NEGATIVE /HPF
BASOPHILS # BLD: 0 K/UL (ref 0–0.1)
BASOPHILS NFR BLD: 1 % (ref 0–2)
BILIRUB SERPL-MCNC: 0.5 MG/DL (ref 0.2–1)
BILIRUB UR QL: NEGATIVE
BUN SERPL-MCNC: 16 MG/DL (ref 7–18)
BUN/CREAT SERPL: 17 (ref 12–20)
CALCIUM SERPL-MCNC: 9.1 MG/DL (ref 8.5–10.1)
CALCULATED P AXIS, ECG09: 50 DEGREES
CALCULATED R AXIS, ECG10: 62 DEGREES
CALCULATED T AXIS, ECG11: 39 DEGREES
CHLORIDE SERPL-SCNC: 103 MMOL/L (ref 100–111)
CHOLEST SERPL-MCNC: 215 MG/DL
CO2 SERPL-SCNC: 29 MMOL/L (ref 21–32)
COLOR UR: YELLOW
CREAT SERPL-MCNC: 0.95 MG/DL (ref 0.6–1.3)
DIAGNOSIS, 93000: NORMAL
DIFFERENTIAL METHOD BLD: ABNORMAL
EOSINOPHIL # BLD: 0.3 K/UL (ref 0–0.4)
EOSINOPHIL NFR BLD: 3 % (ref 0–5)
EPITH CASTS URNS QL MICRO: NORMAL /LPF (ref 0–5)
ERYTHROCYTE [DISTWIDTH] IN BLOOD BY AUTOMATED COUNT: 13.4 % (ref 11.6–14.5)
FOLATE SERPL-MCNC: 12.7 NG/ML (ref 3.1–17.5)
GLOBULIN SER CALC-MCNC: 3.2 G/DL (ref 2–4)
GLUCOSE SERPL-MCNC: 141 MG/DL (ref 74–99)
GLUCOSE UR STRIP.AUTO-MCNC: NEGATIVE MG/DL
HBA1C MFR BLD: 8.2 % (ref 4.2–5.6)
HCT VFR BLD AUTO: 39.7 % (ref 35–45)
HDLC SERPL-MCNC: 55 MG/DL (ref 40–60)
HDLC SERPL: 3.9 {RATIO} (ref 0–5)
HGB BLD-MCNC: 13.1 G/DL (ref 12–16)
HGB UR QL STRIP: NEGATIVE
IRON SERPL-MCNC: 58 UG/DL (ref 50–175)
KETONES UR QL STRIP.AUTO: NEGATIVE MG/DL
LDLC SERPL CALC-MCNC: 120.6 MG/DL (ref 0–100)
LEUKOCYTE ESTERASE UR QL STRIP.AUTO: NEGATIVE
LIPID PROFILE,FLP: ABNORMAL
LYMPHOCYTES # BLD: 1.5 K/UL (ref 0.9–3.6)
LYMPHOCYTES NFR BLD: 18 % (ref 21–52)
MCH RBC QN AUTO: 30.5 PG (ref 24–34)
MCHC RBC AUTO-ENTMCNC: 33 G/DL (ref 31–37)
MCV RBC AUTO: 92.5 FL (ref 74–97)
MONOCYTES # BLD: 0.5 K/UL (ref 0.05–1.2)
MONOCYTES NFR BLD: 5 % (ref 3–10)
NEUTS SEG # BLD: 6.3 K/UL (ref 1.8–8)
NEUTS SEG NFR BLD: 73 % (ref 40–73)
NITRITE UR QL STRIP.AUTO: NEGATIVE
P-R INTERVAL, ECG05: 142 MS
PH UR STRIP: 6 [PH] (ref 5–8)
PLATELET # BLD AUTO: 379 K/UL (ref 135–420)
PMV BLD AUTO: 9.9 FL (ref 9.2–11.8)
POTASSIUM SERPL-SCNC: 4.4 MMOL/L (ref 3.5–5.5)
PROT SERPL-MCNC: 6.8 G/DL (ref 6.4–8.2)
PROT UR STRIP-MCNC: NEGATIVE MG/DL
Q-T INTERVAL, ECG07: 402 MS
QRS DURATION, ECG06: 84 MS
QTC CALCULATION (BEZET), ECG08: 430 MS
RBC # BLD AUTO: 4.29 M/UL (ref 4.2–5.3)
RBC #/AREA URNS HPF: 0 /HPF (ref 0–5)
SODIUM SERPL-SCNC: 138 MMOL/L (ref 136–145)
SP GR UR REFRACTOMETRY: 1.02 (ref 1–1.03)
TRIGL SERPL-MCNC: 197 MG/DL (ref ?–150)
TSH SERPL DL<=0.05 MIU/L-ACNC: 1.91 UIU/ML (ref 0.36–3.74)
UROBILINOGEN UR QL STRIP.AUTO: 0.2 EU/DL (ref 0.2–1)
VENTRICULAR RATE, ECG03: 69 BPM
VIT B12 SERPL-MCNC: 476 PG/ML (ref 211–911)
VLDLC SERPL CALC-MCNC: 39.4 MG/DL
WBC # BLD AUTO: 8.6 K/UL (ref 4.6–13.2)
WBC URNS QL MICRO: NORMAL /HPF (ref 0–4)

## 2019-11-19 PROCEDURE — 93005 ELECTROCARDIOGRAM TRACING: CPT

## 2019-11-19 PROCEDURE — 86677 HELICOBACTER PYLORI ANTIBODY: CPT

## 2019-11-19 PROCEDURE — 36415 COLL VENOUS BLD VENIPUNCTURE: CPT

## 2019-11-19 PROCEDURE — 82306 VITAMIN D 25 HYDROXY: CPT

## 2019-11-19 PROCEDURE — 81001 URINALYSIS AUTO W/SCOPE: CPT

## 2019-11-19 PROCEDURE — 83540 ASSAY OF IRON: CPT

## 2019-11-19 PROCEDURE — 85025 COMPLETE CBC W/AUTO DIFF WBC: CPT

## 2019-11-19 PROCEDURE — 80061 LIPID PANEL: CPT

## 2019-11-19 PROCEDURE — 83036 HEMOGLOBIN GLYCOSYLATED A1C: CPT

## 2019-11-19 PROCEDURE — 84443 ASSAY THYROID STIM HORMONE: CPT

## 2019-11-19 PROCEDURE — 76705 ECHO EXAM OF ABDOMEN: CPT

## 2019-11-19 PROCEDURE — 80053 COMPREHEN METABOLIC PANEL: CPT

## 2019-11-19 PROCEDURE — 71046 X-RAY EXAM CHEST 2 VIEWS: CPT

## 2019-11-19 PROCEDURE — 82607 VITAMIN B-12: CPT

## 2019-11-19 PROCEDURE — 84425 ASSAY OF VITAMIN B-1: CPT

## 2019-11-20 LAB
H PYLORI IGG SER IA-ACNC: <0.8 INDEX VALUE (ref 0–0.79)
H PYLORI IGM SER-ACNC: <9 UNITS (ref 0–8.9)

## 2019-11-21 ENCOUNTER — TELEPHONE (OUTPATIENT)
Dept: SURGERY | Age: 44
End: 2019-11-21

## 2019-11-21 LAB — VIT B1 BLD-SCNC: 164.3 NMOL/L (ref 66.5–200)

## 2019-11-21 NOTE — TELEPHONE ENCOUNTER
----- Message from Dipti Lockhart DO sent at 11/21/2019 10:56 AM EST -----  Needs vitamin D supplementation

## 2019-11-25 ENCOUNTER — DOCUMENTATION ONLY (OUTPATIENT)
Dept: SURGERY | Age: 44
End: 2019-11-25

## 2019-11-25 NOTE — PROGRESS NOTES
[facility]  [address], [address] [place] [address]    Patient's Name: Nhung Franklin   Age: 41 y.o. YOB: 1975   Sex: female    Date:  11/20/2019    Session: 2 of  3  Surgeon:  Dr. Lisandra Cardona    Height: 5'4\" Weight:    310  Lbs. BMI: 54       Starting Weight:  313 Lbs. Do you smoke? no    Alcohol intake:    Number of drinks at a time:  2-3  Number of times a month: 1    Class Guidelines    Guidelines are reviewed with patient at the start of every class. 1. Patient understands that weight loss trial classes must be consecutive. Patient understands if they miss a class, it is their responsibility to contact me to reschedule class. I will reach out to patient after their first no show. 2.  Patient understands the expectations that weight maintenance/weight loss is expected during the classes. Failure to demonstrate changes may result in one extra month of weight loss trial, followed by going back to see the surgeon. 3. Patient is also instructed to be doing their labs, blood work, psych visit, support group and any other test that the surgeon has used while they are working on their weight loss trial.        Changes Made Since Last Class:   Smaller meals  Less sugar. Dietary Instruction    During today's class we continued to focus on the key diet principles. Patient was instructed to follow a low carbohydrate diet, focusing on meat and vegetables. Patient was instructed to stop liquid calories and aim for 64 ounces of water per day. In class, I also gave patient a power point on surviving the holidays. Some of the tips included survival tips for parties, including bringing their own low carbohydrate dish to a potluck dinner and surveying the buffet line before they start filling up their plate.   Patient was given cooking alternatives, including using Splenda or Stevia for sugar, substituting applesauce for oil in recipes, and using low fat plain yogurt instead of sour cream in dips. Patient was also encouraged to be mindful of calories in alcohol. Patient's diet habits include:   3 meals consisting of meat, vegetable, starches. Physical Activity/Exercise    Patient is currently doing 15-20 minutes of walking  for activity. Today's power point on surviving the holidays also included tips on exercising. This included being creative during the holiday, walking stairs, mall walking, getting resistance bands. Patient was encouraged not to be afraid to excuse themselves from the table to go for a walk after they eat. Behavior Modification    Reinforced behavior changes to make. Patient was encouraged to keep their emotions in check. Try to HALT and focus on whether they are eating out of hunger or if they are eating out of emotions. Other eating behaviors included surveying the buffet line before starting to fill up their plate. Goals that patient set for next month include: Increase exercise routine to include 5 days a week. Eat healthier and make better food choices by decreasing sweets.       Thomas Hamilton RD

## 2019-12-10 ENCOUNTER — OFFICE VISIT (OUTPATIENT)
Dept: SURGERY | Age: 44
End: 2019-12-10

## 2019-12-10 VITALS
HEIGHT: 64 IN | WEIGHT: 293 LBS | DIASTOLIC BLOOD PRESSURE: 92 MMHG | SYSTOLIC BLOOD PRESSURE: 141 MMHG | OXYGEN SATURATION: 90 % | BODY MASS INDEX: 50.02 KG/M2 | TEMPERATURE: 98 F | HEART RATE: 81 BPM

## 2019-12-10 DIAGNOSIS — R73.03 PREDIABETES: ICD-10-CM

## 2019-12-10 DIAGNOSIS — E66.01 MORBID OBESITY (HCC): Primary | ICD-10-CM

## 2019-12-10 DIAGNOSIS — I10 ESSENTIAL HYPERTENSION: ICD-10-CM

## 2019-12-10 NOTE — PATIENT INSTRUCTIONS
If you have any questions or concerns about today's appointment, the verbal and/or written instructions you were given for follow up care, please call our office at [phone].     [facility] - [address]    [phone] office  [phone]

## 2019-12-10 NOTE — PROGRESS NOTES
Bariatric Preoperative Progress Note    Subjective:     Migel Mtz is a 41 y.o. female who presents today for followup of their candidacy for bariatric surgery. Since last seen, Migel Mtz has been working through bariatric program towards Laparoscopic Gastric Bypass     Past Medical History:   Diagnosis Date    Anxiety     Blurred vision, bilateral     Burning with urination     Chest pain     Coughing up blood     one episode    Depression     Diarrhea     Ear ache     Fibromyalgia     Fibromyalgia     Headache     Heartburn     Hypercholesteremia     Hypertension     Joint pain     Muscle ache     Pelvic pain     Pre-diabetes     Recent change in weight     Sleep apnea     Tiredness     Trouble in sleeping        Past Surgical History:   Procedure Laterality Date    HX TONSILLECTOMY         Current Outpatient Medications   Medication Sig Dispense Refill    spironolactone (ALDACTONE) 25 mg tablet Take  by mouth daily.  DULoxetine (CYMBALTA) 60 mg capsule Take 60 mg by mouth daily.  carisoprodol (SOMA) 350 mg tablet Take 350 mg by mouth two (2) times a day.  oxyCODONE-acetaminophen (PERCOCET) 7.5-325 mg per tablet Take  by mouth every four (4) hours as needed.  ibuprofen (MOTRIN) 800 mg tablet Take  by mouth.  pravastatin (PRAVACHOL) 10 mg tablet Take  by mouth nightly.            Allergies   Allergen Reactions    Pcn [Penicillins] Other (comments)     hives    Penicillins Hives    Wellbutrin [Bupropion Hcl] Other (comments)     hives    Wellbutrin [Bupropion] Hives       ROS:  Review of Systems:  Positives in [place]    Constitutional:  Unexpected weight gain/loss, night sweats,fatigue/maliase/lethargy, change in sleep, fever, rash  Eyes:  Visual changes, eye pain, floaters  ENT:  Rhinorrhea, epistaxis, sinus pain, change in hearing, gingival bleeding, sore throat, dysphagia, dysphonia  CV:  Chest pain, shortness of breath, difficulty breathing, orthopnea, palpitations, loss of consciousness, claudication  Resp: Cough, wheeze, haemoptysis, sob, exercise intolerence  GI:  Abdominal pain, dysphagia, reflux, bloating, cramping. Obstipation, haematemesis, brbpr, hematochezia,dark tarry stools. Nausea, vomitting, diarrhea, constipation. : Change in frequency of urination, dysuria, hematuria, change in force of Stream  Neuro: Paresthesias, numbness, weakness, changes in balance, changes in speech, loss of control of bowel or bladder, headaches. Psych:Changes in depression, anxiety, sleep patterns, change in energy Levels  Endocrine: Temperature intolerance, dry skin, hair loss, fatigue,  Episodes of hypoglycemia, changes in libido  Heme: Anemia, pupura, petechia  Skin: Rashes, itchiness, excessive bruising  Musculoskeletal: weakness, arthropathy, lower back pain    Remainder of ROS as per HPI. Objective:     Physical Exam:  Visit Vitals  BP (!) 141/92 (BP 1 Location: Right arm, BP Patient Position: Sitting)   Pulse 81   Temp 98 °F (36.7 °C) (Oral)   Ht 5' 4\" (1.626 m)   Wt 141.1 kg (311 lb)   SpO2 90%   BMI 53.38 kg/m²         General: AAOX3, pleasant and cooperative to exam. Appropriately groomed. NAD. Non-toxic in appearance. Appears stated age. HENT: NC/AT. PERRLA. Extraocular motions are intact. Sclera anicteric, Conjunctiva Clear. Nares clear. Oropharynx pink, moist without exudate or erythema. Uvula Midline. Neck:  Supple, trachea is midline. No JVD, Lymphadenopathy. No bruits. Chest: Good equal bilateral expansion  Lungs: Clear to auscultation bilaterally without e/o crackles, wheezes or rhales. Heart: RRR, S1 and S2 noted. No c/r/m/g/vpmi. Abdomen: obese, soft and non-tender without distension. Good bowel sounds. No vis/palp masses or pulsations. No organo-splenomegaly. No hernias to my exam. No e/o acute abdomen or peritoneal signs. Pelvis: Stable.    :  Deferred  Rectal: Deferred  Extremities: Positive pulses in all 4 extremities. Baseline range of motion in all 4 extremities. Strength, sensation and reflexes intact, appropriate and equal in b/l upper and lower extremities. No C/C/E  Neuro: CN II-XII grossly intact without focal deficit. Ambulatory. Skin: Clean, warm and dry. Studies to date:    Labs: significant for Hypercholesterolemia, Elevated A1C, Vitamin D Deficiency     EKG: Complete 11/19/19    Nutritional evaluation: 3 of 4 complete, last visit scheduled 12/23/19    Psychiatric evaluation:Complete and cleared 10/21/19    Support Group: Pending       Additional evaluations:RUQ ultrasound complete 11/19/19, Sleep Study complete await results        Assessment:   Roesndo Robison is a 41 y.o. female who is progressing through the bariatric preoperative evaluation. At this time, they are yet an appropriate candidate for weight loss surgery. Ms. Fantasma Head has a reminder for a \"due or due soon\" health maintenance. I have asked that she contact her primary care provider for follow-up on this health maintenance. Plan:   -complete remainder of preop evaluation including Support group (attending next week), last nutrition visit, sleep study documents from pulmonologist.   -Patient voices understanding that weight gain during weight loss trial may result in cancellation of weight loss surgery.   -Follow up once has completed entirety of weight loss workup to determine next steps. A total of 25 minutes was spent with the patient, with > 50% of time spent in counseling and coordination of care.     Adali Loyd, JAYLEN-BC

## 2019-12-10 NOTE — PROGRESS NOTES
Carlton Khoury is a 41 y.o. female (: 1975) presenting to address:    Chief Complaint   Patient presents with    Weight Management     Mid trial/ LGBP       Medication list and allergies have been reviewed with Carlton Khoury and updated as of today's date. I have gone over all Medical, Surgical and Social History with Carlton Khoury and updated/added the information accordingly. 1. Have you been to the ER, Urgent Care or Hospitalized since your last visit? NO      2. Have you followed up with your PCP or any other Physicians since your procedure/ last office visit? YES. Routine care/ blood work.  Last week

## 2019-12-29 ENCOUNTER — DOCUMENTATION ONLY (OUTPATIENT)
Dept: SURGERY | Age: 44
End: 2019-12-29

## 2019-12-30 NOTE — PROGRESS NOTES
[employer] [facility] [place]                                                                  [facility]                                                               [address], [address]                                                                   [place], [address]      Name: Amaris Hartman  : 1975      Date: 2019         Session:  3 of  3   Surgeon:   Dr. Chris Roberson     Height: 5'4\"   Weight:    312     Lbs. BMI: 53         Starting Weight: 313     Do you smoke? no    Alcohol intake:    Patient drinks occasionally. Number of drinks at a time. 1-2  Number of times a month: 1    Class Guidelines    Guidelines are reviewed with patient at the start of every class. 1. Patient understands that weight loss trial classes must be consecutive. Patient understands if they miss a class, it is their responsibility to contact me to reschedule class. I will reach out to patient after their first no show. 2.  Patient understands the expectations that weight maintenance/weight loss is expected during the classes. Failure to demonstrate changes may result in one extra month of weight loss trial, followed by going back to see the surgeon. Patient understands that they CAN NOT gain any weight during the weight loss trial.  Gaining weight will result in extra classes. 3. Patient is also instructed to be doing their labs, blood work, psych visit, support group and any other test that the surgeon has used while they are working on their weight loss trial.  4.  Patient was instructed to bring their blue binder to every class and appointment. Eating Habits and Behaviors    Today in class, we reviewed the key diet principles. Specifically, patient was instructed to watch their carbohydrate intake. We talked about foods that have carbohydrates in them and alternatives in place of this.   Patient was encouraged to start cutting out bread, rice, pasta, and other starches. Patient is encouraged to work towards 64 ounces of fluid per day, avoiding soda, sweet tea, and fruit juices. We also discussed how to make the best choices when eating out. We looked at fast food traps and dining out strategies to stay in control. Patient was also given ideas from various restaurants that would be a healthier option. We also discussed vitamin protocol if patient pursues surgical weight loss. Patient's current diet habits include:  3 meals consisting of boiled eggs, yogurt, turkey breast, lunch meat, string cheese, meat and veggies. Physical Activity/Exercise    Comments: We talked about exercise. Patient was given reasons of why exercise is so important and how that can help with their long-term success. I have encouraged patient to get a support system to help with the activity. Currently for activity, patient is doing more walking. Behavior Modification       Comments:  I have also talked to patient about some behavior changes including taking 20-30 minutes to eat a meal.  Patient is encouraged to get a timer and use smaller utensils to help them stretch their meal out. Patient is also encouraged to get into a routine of not eating after 7 pm and make the TV a no eating zone. Goals that patient wants to work on includes:  1. Increase activity. 2. Decrease carbohydrate intake and portions sizes. Conclusion:  Patient has completed a  3  month WLT and managed to lose a little weight by implementing what was taught in class. Patient is cleared to proceed from a nutritional viewpoint.       Song Berry RD

## 2020-01-15 ENCOUNTER — TELEPHONE (OUTPATIENT)
Dept: SURGERY | Age: 45
End: 2020-01-15

## 2020-01-15 NOTE — TELEPHONE ENCOUNTER
Patient LVM to confirm if sleep study results has been received from Dr Miriam Morgan office via fax. States was told by Dr Miriam Morgan office results were faxed last Friday evening 01/10/20. Advised patient I have checked with nurses and the  personnel and they have not received records. Patient states will call Dr Miriam Morgan' office again to have them refax the records. Patient states this is the only paperwork Dr. Glory Dill is waiting for to schedule bypass surgery.

## 2020-01-31 ENCOUNTER — OFFICE VISIT (OUTPATIENT)
Dept: SURGERY | Age: 45
End: 2020-01-31

## 2020-01-31 VITALS
HEIGHT: 64 IN | HEART RATE: 82 BPM | DIASTOLIC BLOOD PRESSURE: 92 MMHG | TEMPERATURE: 97 F | OXYGEN SATURATION: 94 % | WEIGHT: 293 LBS | BODY MASS INDEX: 50.02 KG/M2 | SYSTOLIC BLOOD PRESSURE: 149 MMHG

## 2020-01-31 DIAGNOSIS — E66.01 MORBID OBESITY (HCC): Primary | ICD-10-CM

## 2020-01-31 RX ORDER — HYDROXYZINE HYDROCHLORIDE 10 MG/1
TABLET, FILM COATED ORAL
COMMUNITY
Start: 2020-01-07

## 2020-01-31 RX ORDER — DOXYCYCLINE HYCLATE 100 MG
TABLET ORAL
COMMUNITY
Start: 2020-01-28 | End: 2020-02-11

## 2020-01-31 RX ORDER — TRIAMCINOLONE ACETONIDE 1 MG/G
CREAM TOPICAL
COMMUNITY
Start: 2020-01-07 | End: 2020-02-19

## 2020-01-31 NOTE — H&P (VIEW-ONLY)
Preop History and Physical Exam: 
 
Danilo Alcaraz is a 45 y.o. white female initially evaluated in his office on 8 October 2019 for discussion of surgical procedures available for definitive management of her super obesity. 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 After discussion of the surgical options available for treatment of weight loss patient is elected to pursue laparoscopic initially open Deyanira-en-Y gastric bypass to achieve definitive durable weight loss on a personal level expected resolution of obesity related comorbidities. The patient presents today after completing all multidisciplinary bariatric surgical programmatic requirements necessary prior to pursuit of surgery for review of the diagnostic evaluation and surgical scheduling noting no new medical or surgical history. The patient currently weighs 308 pounds on a 5 foot 4 inch frame with a body mass index of 53. Ideal body weight 131 pounds, excess body weight 177 pounds, estimated postsurgical weight loss 1 and 42 pounds, expected postsurgical goal weight 160 pounds. Past Medical History:  
Diagnosis Date  Anxiety  Blurred vision, bilateral   
 Burning with urination  Chest pain  Coughing up blood   
 one episode  Depression  Diarrhea  Ear ache  Fibromyalgia  Fibromyalgia  Headache   
 Heartburn  Hypercholesteremia  Hypertension  Joint pain  Muscle ache  Pelvic pain  Pre-diabetes  Recent change in weight  Sarcoidosis of skin  Sleep apnea  Tiredness  Trouble in sleeping Past Surgical History:  
Procedure Laterality Date  AMB POC SKIN BIOPSY  HX TONSILLECTOMY Current Outpatient Medications Medication Sig Dispense Refill  hydroxyzine HCL (ATARAX) 10 mg tablet  triamcinolone acetonide (KENALOG) 0.1 % topical cream     
 doxycycline (VIBRA-TABS) 100 mg tablet  DULoxetine (CYMBALTA) 60 mg capsule Take 60 mg by mouth daily.  carisoprodol (SOMA) 350 mg tablet Take 350 mg by mouth two (2) times a day.  oxyCODONE-acetaminophen (PERCOCET) 7.5-325 mg per tablet Take  by mouth every four (4) hours as needed.  ibuprofen (MOTRIN) 800 mg tablet Take  by mouth.  pravastatin (PRAVACHOL) 10 mg tablet Take  by mouth nightly.  spironolactone (ALDACTONE) 25 mg tablet Take  by mouth daily. Allergies Allergen Reactions  Pcn [Penicillins] Other (comments)  
  hives  Penicillins Hives  Wellbutrin [Bupropion Hcl] Other (comments)  
  hives  Wellbutrin [Bupropion] Hives Social History Tobacco Use  Smoking status: Former Smoker  Smokeless tobacco: Never Used  Tobacco comment: 2010 Substance Use Topics  Alcohol use: Not Currently Comment: ocass  Drug use: Never Family History Problem Relation Age of Onset  Diabetes Maternal Grandmother  Heart Disease Maternal Grandmother  Hypertension Maternal Grandmother  Stroke Maternal Grandmother  Diabetes Paternal Grandmother  Diabetes Paternal Grandfather  Heart Disease Paternal Grandfather  Hypertension Paternal Grandfather  Stroke Paternal Grandfather  Heart Disease Mother Review of Systems:  Positive in BOLD 
 
CONST: Fever, weight loss, fatigue or chills GI: Nausea, vomiting, abdominal pain, change in bowel habits, hematochezia, melena, and GERD INTEG: Dermatitis, abnormal moles HEENT: Recent changes in vision, vertigo, epistaxis, dysphagia and hoarseness CV: Chest pain, palpitations, HTN, edema and varicosities RESP: Cough, shortness of breath, wheezing, hemoptysis, snoring and reactive airway disease : Hematuria, dysuria, frequency, urgency, nocturia and stress urinary incontinence MS: Weakness, joint pain and arthritis ENDO: Diabetes, thyroid disease, polyuria, polydipsia, polyphagia, poor wound healing, heat intolerance, cold intolerance LYMPH/HEME: Anemia, bruising and history of blood transfusions NEURO: Dizziness, headache, fainting, seizures and stroke PSYCH: Anxiety and depression Physical Exam 
 
Visit Vitals BP (!) 149/92 (BP 1 Location: Right arm, BP Patient Position: Sitting) Pulse 82 Temp 97 °F (36.1 °C) Ht 5' 4\" (1.626 m) Wt 139.9 kg (308 lb 6.4 oz) SpO2 94% BMI 52.94 kg/m² General: Super obese 45 y.o.) female in no acute distress Head: Normocephalic, atraumatic Mouth: Clear, no overt lesions, oral mucosa pink and moist 
Neck: Supple, no masses, no adenopathy or carotid bruits, trachea midline Resp: Clear to auscultation bilaterally, now wheeze, rhonchi, or rales, excursions normal and symmetrical 
Cardio: Regular rate and rhythm, no murmurs, clicks, gallops, or rubs. No edema or varicosities. Abdomen: Obese, soft, nontender, nondistended, normoactive bowel sounds, no hernias, no hepatosplenomegaly, Extremities: Warm, well perfused, no tenderness or swelling, normal gait/station Neuro: Sensation and strength grossly intact and symmetrical 
Psych: Alert and oriented to person, place, and time. November 19, 2019 CBC, CMP, cholesterol panel, TSH, urinalysis, vitamin B1, vitamin B12, folate, iron, vitamin D, H. pylori serology, hemoglobin A1c: Cholesterol 218, triglycerides 297, glucose 141, hemoglobin A1c 8.2, vitamin D 23.9 with remainder laboratory parameters within normal limits. The patient at time of receipt of her laboratory parameters was begun on supplemental vitamin D. Gastric: Pending November 11, 2019 right upper quadrant ultrasound: Cholelithiasis, hepatic steatosis October 8, 2019 chest x-ray: No active cardiopulmonary disease, prominent hilar structures. Bilateral mammography pending December 23, 2019. Nutrition evaluation/Shanda: Concurring with pursuit of surgery. October 31st 2019 clinical psychology consultations/Kalani: Concurring with pursuit of surgery. November 19, 2019 EKG: Normal sinus rhythm with rate of 69normal EKG Impression: 
 
Winston Vasquez is a 45 y.o. female   white female with a body mass index of 53 and obese related comorbidities of hypertension, adult-onset diabetes mellitus, hypercholesterolemia, hypertriglyceridemia, CPAP and obstructive sleep apnea, weight related arthropathyback, hips, knees who would benefit from bariatric surgery. We've discussed the restrictive and malabsorptive nature of the gastric bypass and compared and contrasted with the sleeve gastrectomy. The patient understands the likelihood of losing approximately 80% of their excess weight in 12 to 18 months. She also understands the risks including but not limited to bleeding, infection, need for reoperation, anastomotic ulcers, leaks and strictures, bowel obstruction secondary to adhesions and internal hernias, DVT, PE, heart attack, stroke, and death. Patient also understands risks of inadequate weight loss, excess weight loss, vitamin insufficiency, protein malnutrition, excess skin, and loss of hair. We have reviewed the components of a successful postoperative course including requirement for a high protein, low carbohydrate diet, 60 oz a day of zero calorie liquids, daily vitamin supplementation, daily exercise, regular follow-up, and participation in support groups. We have reviewed the preoperative liver shrinking clear liquid diet, as well as reviewed any medication changes required while on the clear liquid diet.   In addition, the patient understands that all medications to be taken during the first 8 weeks postoperatively can be taken whole as long as the medication is approximately the size of a Srinivas 325 mg aspirin tablet in size. The patient further understands that it is his/her responsibility to review these and verify with their primary care doctor and pharmacist that all medications are of the appropriate size. We will schedule the patient for laparoscopic gastric bypassin the near future. We will obtain completion results of her Pap smear and mammography prior to the pursuit of surgical intervention. Patient is additionally consented for laparoscopic cholecystectomy with Intra-Op cholangiography for definitive treatment of her symptomatic chronic calculus cholecystitis as well as laparoscopic to open left hepatic lobe wedge biopsy definitively histologic characterize the preoperative count also agraffe findings of fatty infiltration of the liver

## 2020-01-31 NOTE — PROGRESS NOTES
Preop History and Physical Exam:    Atilio Celestin is a 45 y.o. white female initially evaluated in his office on 8 October 2019 for discussion of surgical procedures available for definitive management of her super obesity. 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 After discussion of the surgical options available for treatment of weight loss patient is elected to pursue laparoscopic initially open Deyanira-en-Y gastric bypass to achieve definitive durable weight loss on a personal level expected resolution of obesity related comorbidities. The patient presents today after completing all multidisciplinary bariatric surgical programmatic requirements necessary prior to pursuit of surgery for review of the diagnostic evaluation and surgical scheduling noting no new medical or surgical history. The patient currently weighs 308 pounds on a 5 foot 4 inch frame with a body mass index of 53. Ideal body weight 131 pounds, excess body weight 177 pounds, estimated postsurgical weight loss 1 and 42 pounds, expected postsurgical goal weight 160 pounds.     Past Medical History:   Diagnosis Date    Anxiety     Blurred vision, bilateral     Burning with urination     Chest pain     Coughing up blood     one episode    Depression     Diarrhea     Ear ache     Fibromyalgia     Fibromyalgia     Headache     Heartburn     Hypercholesteremia     Hypertension     Joint pain     Muscle ache     Pelvic pain     Pre-diabetes     Recent change in weight     Sarcoidosis of skin     Sleep apnea     Tiredness     Trouble in sleeping        Past Surgical History:   Procedure Laterality Date    AMB POC SKIN BIOPSY      HX TONSILLECTOMY         Current Outpatient Medications Medication Sig Dispense Refill    hydroxyzine HCL (ATARAX) 10 mg tablet       triamcinolone acetonide (KENALOG) 0.1 % topical cream       doxycycline (VIBRA-TABS) 100 mg tablet       DULoxetine (CYMBALTA) 60 mg capsule Take 60 mg by mouth daily.  carisoprodol (SOMA) 350 mg tablet Take 350 mg by mouth two (2) times a day.  oxyCODONE-acetaminophen (PERCOCET) 7.5-325 mg per tablet Take  by mouth every four (4) hours as needed.  ibuprofen (MOTRIN) 800 mg tablet Take  by mouth.  pravastatin (PRAVACHOL) 10 mg tablet Take  by mouth nightly.  spironolactone (ALDACTONE) 25 mg tablet Take  by mouth daily.          Allergies   Allergen Reactions    Pcn [Penicillins] Other (comments)     hives    Penicillins Hives    Wellbutrin [Bupropion Hcl] Other (comments)     hives    Wellbutrin [Bupropion] Hives       Social History     Tobacco Use    Smoking status: Former Smoker    Smokeless tobacco: Never Used    Tobacco comment: 2010   Substance Use Topics    Alcohol use: Not Currently     Comment: ocass    Drug use: Never       Family History   Problem Relation Age of Onset    Diabetes Maternal Grandmother     Heart Disease Maternal Grandmother     Hypertension Maternal Grandmother     Stroke Maternal Grandmother     Diabetes Paternal Grandmother     Diabetes Paternal Grandfather     Heart Disease Paternal Grandfather     Hypertension Paternal Grandfather     Stroke Paternal Grandfather     Heart Disease Mother        Review of Systems:  Positive in BOLD    CONST: Fever, weight loss, fatigue or chills  GI: Nausea, vomiting, abdominal pain, change in bowel habits, hematochezia, melena, and GERD   INTEG: Dermatitis, abnormal moles  HEENT: Recent changes in vision, vertigo, epistaxis, dysphagia and hoarseness  CV: Chest pain, palpitations, HTN, edema and varicosities  RESP: Cough, shortness of breath, wheezing, hemoptysis, snoring and reactive airway disease  : Hematuria, dysuria, frequency, urgency, nocturia and stress urinary incontinence   MS: Weakness, joint pain and arthritis  ENDO: Diabetes, thyroid disease, polyuria, polydipsia, polyphagia, poor wound healing, heat intolerance, cold intolerance  LYMPH/HEME: Anemia, bruising and history of blood transfusions  NEURO: Dizziness, headache, fainting, seizures and stroke  PSYCH: Anxiety and depression    Physical Exam    Visit Vitals  BP (!) 149/92 (BP 1 Location: Right arm, BP Patient Position: Sitting)   Pulse 82   Temp 97 °F (36.1 °C)   Ht 5' 4\" (1.626 m)   Wt 139.9 kg (308 lb 6.4 oz)   SpO2 94%   BMI 52.94 kg/m²         General: Super obese 45 y.o.) female in no acute distress  Head: Normocephalic, atraumatic  Mouth: Clear, no overt lesions, oral mucosa pink and moist  Neck: Supple, no masses, no adenopathy or carotid bruits, trachea midline  Resp: Clear to auscultation bilaterally, now wheeze, rhonchi, or rales, excursions normal and symmetrical  Cardio: Regular rate and rhythm, no murmurs, clicks, gallops, or rubs. No edema or varicosities. Abdomen: Obese, soft, nontender, nondistended, normoactive bowel sounds, no hernias, no hepatosplenomegaly,   Extremities: Warm, well perfused, no tenderness or swelling, normal gait/station  Neuro: Sensation and strength grossly intact and symmetrical  Psych: Alert and oriented to person, place, and time. November 19, 2019 CBC, CMP, cholesterol panel, TSH, urinalysis, vitamin B1, vitamin B12, folate, iron, vitamin D, H. pylori serology, hemoglobin A1c: Cholesterol 218, triglycerides 297, glucose 141, hemoglobin A1c 8.2, vitamin D 23.9 with remainder laboratory parameters within normal limits. The patient at time of receipt of her laboratory parameters was begun on supplemental vitamin D. Gastric: Pending  November 11, 2019 right upper quadrant ultrasound: Cholelithiasis, hepatic steatosis  October 8, 2019 chest x-ray: No active cardiopulmonary disease, prominent hilar structures.   Bilateral mammography- pending  December 23, 2019. Nutrition evaluation/Shanda: Concurring with pursuit of surgery. October 31st 2019 clinical psychology consultations/Kalani: Concurring with pursuit of surgery. November 19, 2019 EKG: Normal sinus rhythm with rate of 69-normal EKG        Impression:    Zoey Garcia is a 45 y.o. female   white female with a body mass index of 53 and obese related comorbidities of hypertension, adult-onset diabetes mellitus, hypercholesterolemia, hypertriglyceridemia, CPAP and obstructive sleep apnea, weight related arthropathy-back, hips, knees who would benefit from bariatric surgery. We've discussed the restrictive and malabsorptive nature of the gastric bypass and compared and contrasted with the sleeve gastrectomy. The patient understands the likelihood of losing approximately 80% of their excess weight in 12 to 18 months. She also understands the risks including but not limited to bleeding, infection, need for reoperation, anastomotic ulcers, leaks and strictures, bowel obstruction secondary to adhesions and internal hernias, DVT, PE, heart attack, stroke, and death. Patient also understands risks of inadequate weight loss, excess weight loss, vitamin insufficiency, protein malnutrition, excess skin, and loss of hair. We have reviewed the components of a successful postoperative course including requirement for a high protein, low carbohydrate diet, 60 oz a day of zero calorie liquids, daily vitamin supplementation, daily exercise, regular follow-up, and participation in support groups. We have reviewed the preoperative liver shrinking clear liquid diet, as well as reviewed any medication changes required while on the clear liquid diet. In addition, the patient understands that all medications to be taken during the first 8 weeks postoperatively can be taken whole as long as the medication is approximately the size of a Srinivas 325 mg aspirin tablet in size.   The patient further understands that it is his/her responsibility to review these and verify with their primary care doctor and pharmacist that all medications are of the appropriate size. We will schedule the patient for laparoscopic gastric bypassin the near future. We will obtain completion results of her Pap smear and mammography prior to the pursuit of surgical intervention.   Patient is additionally consented for laparoscopic cholecystectomy with Intra-Op cholangiography for definitive treatment of her symptomatic chronic calculus cholecystitis as well as laparoscopic to open left hepatic lobe wedge biopsy definitively histologic characterize the preoperative count also agraffe findings of fatty infiltration of the liver

## 2020-01-31 NOTE — PROGRESS NOTES
Reema Chin is a 45 y.o. female (: 1975) presenting to address:    Chief Complaint   Patient presents with    Surgical Follow-up     [facility]       Medication list and allergies have been reviewed with Reema Giuseppe and updated as of today's date. I have gone over all Medical, Surgical and Social History with Reema Chin and updated/added the information accordingly. 1. Have you been to the ER, Urgent Care or Hospitalized since your last visit? NO      2. Have you followed up with your PCP or any other Physicians since your procedure/ last office visit?    YES

## 2020-02-04 ENCOUNTER — TELEPHONE (OUTPATIENT)
Dept: MEDSURG UNIT | Age: 45
End: 2020-02-04

## 2020-02-04 ENCOUNTER — DOCUMENTATION ONLY (OUTPATIENT)
Dept: BARIATRICS/WEIGHT MGMT | Age: 45
End: 2020-02-04

## 2020-02-04 NOTE — PROGRESS NOTES
CLINICAL NUTRITION PRE-OPERATIVE EDUCATION    Patient's Name: Arturo Quinn   Age: 45 y.o. YOB: 1975   Sex: female    Education & Materials Provided:   - Soft Protein Diet Shopping List  -  Supplemental Resource Guide: MVI, B12, Calcium Citrate, Vitamin D, Vitamin B1,   and iron recommendations  - Protein Supplement Information  - Fluid Requirements/ No Straws  - No Caffeine or Carbonation   - No alcohol              - No Snacks or No Concentrated Sweets     - Exercising   - Support System at [facility] of Support Group meetings. Support System after surgery includes: x     - Key Diet Principles            - Addressed Current Habits/Changes to Make   - Patient has been educated on the liquid diet to begin 1 week prior to surgery. Patient understands the transition of the diet. Patient will begin the clear liquid diet on 2/18/20 and will be on this for 1 weeks. Patient will then start on a soft protein diet on 2/25/20 and will be on this for 5 weeks. Patient will follow up with Registered Dietitian at 6 weeks post op. Attendance of support group: Yes    Summary:  Patient has completed 3 visits with the Registered Dietitian. During these nutrition visits, we focused on dietary changes, behavior changes, and the importance of establishing an exercise routine. The diet protocol that patient was prescribed emphasized on low carbohydrates (less than 100 grams per day prior to surgery and 60-80 grams of protein per day). At today's session, patient was educated on the post-op diet protocol. Patient understands the importance of keeping total fat and sugar less than 3 grams per serving. Patient is aware of the transition of the diet stages and is aware that they will be on clear liquids for 7days, followed by soft protein for 5 weeks. Patient understands the body needs ~ 60-70 grams of protein per day.   During the liquid phase, patient will need 60 grams of protein from shakes. Once eating soft protein, patient will only need 1 shake per day. Patient is aware of the importance of the vitamins and protein drinks. We spent a lot of time talking about the vitamins. Patient understands the importance of being compliant with the diet protocol and the complications and risks that can occur if they are non-compliant with the nutritional protocol and non-compliant with the vitamins. Patient has also been educated on the pre-op liquid diet for 1 week. Patient understands that failure to comply in pre-op liquid diet could result in surgery being canceled. Patient's 6 week post-op nutrition appointment has been scheduled. At this 6 week visit, RD will assess how patient is tolerating soft protein and advance to vegetables, if tolerating soft protein without difficulty. Patient will also see RD again at 9 months post-op. This visit will assess patient's compliance with current protocol, including diet, vitamins, protein shakes, and exercise. Post-op diet guidelines will be reinforced. RD is available for questions and to meet with patient outside of the 6 week and 9 month post-op visit. Ok to proceed.      Candidate for surgery: Yes  Re-evaluation Date:     Patti Malloy Justin [address] RD  2/4/2020

## 2020-02-11 RX ORDER — BETAMETHASONE DIPROPIONATE 0.5 MG/G
CREAM TOPICAL
COMMUNITY
Start: 2020-01-17 | End: 2020-02-19

## 2020-02-11 RX ORDER — CETIRIZINE HCL 10 MG
TABLET ORAL
COMMUNITY
Start: 2020-01-07

## 2020-02-11 NOTE — PERIOP NOTES
PAT - SURGICAL PRE-ADMISSION INSTRUCTIONS    NAME:  Lisa Found                                                          TODAY'S DATE:  2/11/2020    SURGERY DATE:  2/17/2020                                  SURGERY ARRIVAL TIME:   TBV    1. Do NOT eat or drink anything, including candy or gum, after MIDNIGHT on 02/16/2020 , unless you have specific instructions from your Surgeon or Anesthesia Provider to do so. 2. No smoking on the day of surgery. 3. No alcohol 24 hours prior to the day of surgery. 4. No recreational drugs for one week prior to the day of surgery. 5. Leave all valuables, including money/purse, at home. 6. Remove all jewelry, nail polish, makeup (including mascara); no lotions, powders, deodorant, or perfume/cologne/after shave. 7. Glasses/Contact lenses and Dentures may be worn to the hospital.  They will be removed prior to surgery. 8. Call your doctor if symptoms of a cold or illness develop within 24 ours prior to surgery. 9. AN ADULT MUST DRIVE YOU HOME AFTER OUTPATIENT SURGERY. 10. If you are having an OUTPATIENT procedure, please make arrangements for a responsible adult to be with you for 24 hours after your surgery. 11. If you are admitted to the hospital, you will be assigned to a bed after surgery is complete. Normally a family member will not be able to see you until you are in your assigned bed. 15. Family is encouraged to accompany you to the hospital.  We ask visitors in the treatment area to be limited to ONE person at a time to ensure patient privacy. EXCEPTIONS WILL BE MADE AS NEEDED. 15. Children under 12 are discouraged from entering the treatment area and need to be supervised by an adult when in the waiting room. Special Instructions:    NONE. Patient Prep:    shower with anti-bacterial soap    These surgical instructions were reviewed with Michel Romero during the PAT phone call. Directions:   On the morning of surgery, please go to the [facility]. Enter the building from the [facility] entrance, 1st floor (next to the Emergency Room entrance). Take the elevator to the 2nd floor. Sign in at the Registration Desk.     If you have any questions and/or concerns, please do not hesitate to call:  (Prior to the day of surgery)  [facility] unit:  [phone]  (Day of surgery)  [facility] unit:  [phone]

## 2020-02-14 ENCOUNTER — ANESTHESIA EVENT (OUTPATIENT)
Dept: SURGERY | Age: 45
DRG: 620 | End: 2020-02-14
Payer: OTHER GOVERNMENT

## 2020-02-17 ENCOUNTER — ANESTHESIA (OUTPATIENT)
Dept: SURGERY | Age: 45
DRG: 620 | End: 2020-02-17
Payer: OTHER GOVERNMENT

## 2020-02-17 ENCOUNTER — HOSPITAL ENCOUNTER (INPATIENT)
Dept: GENERAL RADIOLOGY | Age: 45
Discharge: HOME | DRG: 620 | End: 2020-02-17
Attending: SURGERY | Admitting: SURGERY
Payer: OTHER GOVERNMENT

## 2020-02-17 ENCOUNTER — HOSPITAL ENCOUNTER (INPATIENT)
Age: 45
LOS: 2 days | Discharge: HOME | DRG: 620 | End: 2020-02-19
Attending: SURGERY | Admitting: SURGERY
Payer: OTHER GOVERNMENT

## 2020-02-17 PROBLEM — E66.01 MORBID OBESITY WITH BMI OF 50.0-59.9, ADULT (HCC): Status: ACTIVE | Noted: 2020-02-17

## 2020-02-17 LAB
GLUCOSE BLD STRIP.AUTO-MCNC: 142 MG/DL (ref 70–110)
GLUCOSE BLD STRIP.AUTO-MCNC: 205 MG/DL (ref 70–110)
HCG UR QL: NEGATIVE

## 2020-02-17 PROCEDURE — 74300 X-RAY BILE DUCTS/PANCREAS: CPT

## 2020-02-17 PROCEDURE — 77030002996 HC SUT SLK J&J -A: Performed by: SURGERY

## 2020-02-17 PROCEDURE — 88313 SPECIAL STAINS GROUP 2: CPT

## 2020-02-17 PROCEDURE — 74011250636 HC RX REV CODE- 250/636: Performed by: SURGERY

## 2020-02-17 PROCEDURE — 88304 TISSUE EXAM BY PATHOLOGIST: CPT

## 2020-02-17 PROCEDURE — 77030033639 HC SHR ENDO COAG HARM 36 J&J -E: Performed by: SURGERY

## 2020-02-17 PROCEDURE — 77030008574 HC TBNG SUC IRR STRY -B: Performed by: SURGERY

## 2020-02-17 PROCEDURE — 77030005518 HC CATH URETH FOL 2W BARD -B: Performed by: SURGERY

## 2020-02-17 PROCEDURE — 74011636637 HC RX REV CODE- 636/637: Performed by: NURSE ANESTHETIST, CERTIFIED REGISTERED

## 2020-02-17 PROCEDURE — 76060000038 HC ANESTHESIA 3.5 TO 4 HR: Performed by: SURGERY

## 2020-02-17 PROCEDURE — 82962 GLUCOSE BLOOD TEST: CPT

## 2020-02-17 PROCEDURE — 77030008602 HC TRCR ENDOSC EPATH J&J -B: Performed by: SURGERY

## 2020-02-17 PROCEDURE — 74011636320 HC RX REV CODE- 636/320: Performed by: SURGERY

## 2020-02-17 PROCEDURE — 0FT44ZZ RESECTION OF GALLBLADDER, PERCUTANEOUS ENDOSCOPIC APPROACH: ICD-10-PCS | Performed by: SURGERY

## 2020-02-17 PROCEDURE — 76010000134 HC OR TIME 3.5 TO 4 HR: Performed by: SURGERY

## 2020-02-17 PROCEDURE — 74011250636 HC RX REV CODE- 250/636: Performed by: NURSE ANESTHETIST, CERTIFIED REGISTERED

## 2020-02-17 PROCEDURE — 77030008437 HC REINF STRP REINF SEMGD WLGO -C: Performed by: SURGERY

## 2020-02-17 PROCEDURE — 74011000250 HC RX REV CODE- 250: Performed by: NURSE ANESTHETIST, CERTIFIED REGISTERED

## 2020-02-17 PROCEDURE — 77030027491 HC SHR ENDOSC COVD -B: Performed by: SURGERY

## 2020-02-17 PROCEDURE — 77030008683 HC TU ET CUF COVD -A: Performed by: NURSE ANESTHETIST, CERTIFIED REGISTERED

## 2020-02-17 PROCEDURE — 77030031139 HC SUT VCRL2 J&J -A: Performed by: SURGERY

## 2020-02-17 PROCEDURE — 77010033678 HC OXYGEN DAILY

## 2020-02-17 PROCEDURE — 81025 URINE PREGNANCY TEST: CPT

## 2020-02-17 PROCEDURE — 77030018831 HC SOL IRR H20 BAXT -A: Performed by: SURGERY

## 2020-02-17 PROCEDURE — 77030008598 HC TRCR ENDOSC BLDLS J&J -B: Performed by: SURGERY

## 2020-02-17 PROCEDURE — 0FB24ZX EXCISION OF LEFT LOBE LIVER, PERCUTANEOUS ENDOSCOPIC APPROACH, DIAGNOSTIC: ICD-10-PCS | Performed by: SURGERY

## 2020-02-17 PROCEDURE — BF131ZZ FLUOROSCOPY OF GALLBLADDER AND BILE DUCTS USING LOW OSMOLAR CONTRAST: ICD-10-PCS | Performed by: SURGERY

## 2020-02-17 PROCEDURE — 0D164ZA BYPASS STOMACH TO JEJUNUM, PERCUTANEOUS ENDOSCOPIC APPROACH: ICD-10-PCS | Performed by: SURGERY

## 2020-02-17 PROCEDURE — 88307 TISSUE EXAM BY PATHOLOGIST: CPT

## 2020-02-17 PROCEDURE — 74011250637 HC RX REV CODE- 250/637: Performed by: SURGERY

## 2020-02-17 PROCEDURE — 74011000272 HC RX REV CODE- 272: Performed by: SURGERY

## 2020-02-17 PROCEDURE — 77030013079 HC BLNKT BAIR HGGR 3M -A: Performed by: NURSE ANESTHETIST, CERTIFIED REGISTERED

## 2020-02-17 PROCEDURE — 74011000250 HC RX REV CODE- 250: Performed by: SURGERY

## 2020-02-17 PROCEDURE — 77030008603 HC TRCR ENDOSC EPATH J&J -C: Performed by: SURGERY

## 2020-02-17 PROCEDURE — 77030002933 HC SUT MCRYL J&J -A: Performed by: SURGERY

## 2020-02-17 PROCEDURE — 77030027876 HC STPLR ENDOSC FLX PWR J&J -G1: Performed by: SURGERY

## 2020-02-17 PROCEDURE — 65270000029 HC RM PRIVATE

## 2020-02-17 PROCEDURE — 76210000016 HC OR PH I REC 1 TO 1.5 HR: Performed by: SURGERY

## 2020-02-17 PROCEDURE — 88312 SPECIAL STAINS GROUP 1: CPT

## 2020-02-17 PROCEDURE — 77030032490 HC SLV COMPR SCD KNE COVD -B: Performed by: SURGERY

## 2020-02-17 PROCEDURE — 77030036732 HC RELD STPLR VASC J&J -F: Performed by: SURGERY

## 2020-02-17 PROCEDURE — 77030009968 HC RELD STPLR ENDOSC J&J -D: Performed by: SURGERY

## 2020-02-17 RX ORDER — ACETAMINOPHEN 650 MG/1
650 SUPPOSITORY RECTAL ONCE
Status: COMPLETED | OUTPATIENT
Start: 2020-02-17 | End: 2020-02-17

## 2020-02-17 RX ORDER — GLYCOPYRROLATE 0.2 MG/ML
INJECTION INTRAMUSCULAR; INTRAVENOUS AS NEEDED
Status: DISCONTINUED | OUTPATIENT
Start: 2020-02-17 | End: 2020-02-17 | Stop reason: HOSPADM

## 2020-02-17 RX ORDER — INSULIN LISPRO 100 [IU]/ML
INJECTION, SOLUTION INTRAVENOUS; SUBCUTANEOUS ONCE
Status: DISCONTINUED | OUTPATIENT
Start: 2020-02-17 | End: 2020-02-17 | Stop reason: HOSPADM

## 2020-02-17 RX ORDER — ALBUTEROL SULFATE 2.5 MG/.5ML
2.5 SOLUTION RESPIRATORY (INHALATION) AS NEEDED
Status: DISCONTINUED | OUTPATIENT
Start: 2020-02-17 | End: 2020-02-17 | Stop reason: HOSPADM

## 2020-02-17 RX ORDER — MIDAZOLAM HYDROCHLORIDE 1 MG/ML
INJECTION, SOLUTION INTRAMUSCULAR; INTRAVENOUS AS NEEDED
Status: DISCONTINUED | OUTPATIENT
Start: 2020-02-17 | End: 2020-02-17 | Stop reason: HOSPADM

## 2020-02-17 RX ORDER — SODIUM CHLORIDE, SODIUM LACTATE, POTASSIUM CHLORIDE, CALCIUM CHLORIDE 600; 310; 30; 20 MG/100ML; MG/100ML; MG/100ML; MG/100ML
150 INJECTION, SOLUTION INTRAVENOUS CONTINUOUS
Status: DISCONTINUED | OUTPATIENT
Start: 2020-02-17 | End: 2020-02-17 | Stop reason: HOSPADM

## 2020-02-17 RX ORDER — LIDOCAINE HYDROCHLORIDE 20 MG/ML
INJECTION, SOLUTION EPIDURAL; INFILTRATION; INTRACAUDAL; PERINEURAL AS NEEDED
Status: DISCONTINUED | OUTPATIENT
Start: 2020-02-17 | End: 2020-02-17 | Stop reason: HOSPADM

## 2020-02-17 RX ORDER — DEXTROSE MONOHYDRATE 100 MG/ML
125-250 INJECTION, SOLUTION INTRAVENOUS AS NEEDED
Status: DISCONTINUED | OUTPATIENT
Start: 2020-02-17 | End: 2020-02-17 | Stop reason: HOSPADM

## 2020-02-17 RX ORDER — SODIUM CHLORIDE 0.9 % (FLUSH) 0.9 %
5-40 SYRINGE (ML) INJECTION AS NEEDED
Status: DISCONTINUED | OUTPATIENT
Start: 2020-02-17 | End: 2020-02-17 | Stop reason: HOSPADM

## 2020-02-17 RX ORDER — INSULIN LISPRO 100 [IU]/ML
INJECTION, SOLUTION INTRAVENOUS; SUBCUTANEOUS ONCE
Status: COMPLETED | OUTPATIENT
Start: 2020-02-17 | End: 2020-02-17

## 2020-02-17 RX ORDER — BETAMETHASONE DIPROPIONATE 0.5 MG/G
CREAM TOPICAL DAILY
Status: DISCONTINUED | OUTPATIENT
Start: 2020-02-18 | End: 2020-02-17

## 2020-02-17 RX ORDER — FENTANYL CITRATE 50 UG/ML
INJECTION, SOLUTION INTRAMUSCULAR; INTRAVENOUS AS NEEDED
Status: DISCONTINUED | OUTPATIENT
Start: 2020-02-17 | End: 2020-02-17 | Stop reason: HOSPADM

## 2020-02-17 RX ORDER — NALOXONE HYDROCHLORIDE 0.4 MG/ML
0.04 INJECTION, SOLUTION INTRAMUSCULAR; INTRAVENOUS; SUBCUTANEOUS AS NEEDED
Status: DISCONTINUED | OUTPATIENT
Start: 2020-02-17 | End: 2020-02-17 | Stop reason: HOSPADM

## 2020-02-17 RX ORDER — NEOSTIGMINE METHYLSULFATE 1 MG/ML
INJECTION INTRAVENOUS AS NEEDED
Status: DISCONTINUED | OUTPATIENT
Start: 2020-02-17 | End: 2020-02-17 | Stop reason: HOSPADM

## 2020-02-17 RX ORDER — PROPOFOL 10 MG/ML
INJECTION, EMULSION INTRAVENOUS AS NEEDED
Status: DISCONTINUED | OUTPATIENT
Start: 2020-02-17 | End: 2020-02-17 | Stop reason: HOSPADM

## 2020-02-17 RX ORDER — HYDROMORPHONE HYDROCHLORIDE 1 MG/ML
0.5 INJECTION, SOLUTION INTRAMUSCULAR; INTRAVENOUS; SUBCUTANEOUS AS NEEDED
Status: DISCONTINUED | OUTPATIENT
Start: 2020-02-17 | End: 2020-02-17 | Stop reason: HOSPADM

## 2020-02-17 RX ORDER — SODIUM CHLORIDE, SODIUM LACTATE, POTASSIUM CHLORIDE, CALCIUM CHLORIDE 600; 310; 30; 20 MG/100ML; MG/100ML; MG/100ML; MG/100ML
50 INJECTION, SOLUTION INTRAVENOUS CONTINUOUS
Status: DISCONTINUED | OUTPATIENT
Start: 2020-02-17 | End: 2020-02-17 | Stop reason: HOSPADM

## 2020-02-17 RX ORDER — INSULIN LISPRO 100 [IU]/ML
INJECTION, SOLUTION INTRAVENOUS; SUBCUTANEOUS
Status: DISPENSED
Start: 2020-02-17 | End: 2020-02-18

## 2020-02-17 RX ORDER — HYDROMORPHONE HYDROCHLORIDE 2 MG/ML
0.5 INJECTION, SOLUTION INTRAMUSCULAR; INTRAVENOUS; SUBCUTANEOUS AS NEEDED
Status: DISCONTINUED | OUTPATIENT
Start: 2020-02-17 | End: 2020-02-17

## 2020-02-17 RX ORDER — ENOXAPARIN SODIUM 100 MG/ML
40 INJECTION SUBCUTANEOUS ONCE
Status: COMPLETED | OUTPATIENT
Start: 2020-02-17 | End: 2020-02-17

## 2020-02-17 RX ORDER — DULOXETIN HYDROCHLORIDE 30 MG/1
60 CAPSULE, DELAYED RELEASE ORAL DAILY
Status: DISCONTINUED | OUTPATIENT
Start: 2020-02-18 | End: 2020-02-19 | Stop reason: HOSPADM

## 2020-02-17 RX ORDER — ONDANSETRON 2 MG/ML
4 INJECTION INTRAMUSCULAR; INTRAVENOUS ONCE
Status: COMPLETED | OUTPATIENT
Start: 2020-02-17 | End: 2020-02-17

## 2020-02-17 RX ORDER — CETIRIZINE HCL 10 MG
10 TABLET ORAL DAILY
Status: DISCONTINUED | OUTPATIENT
Start: 2020-02-18 | End: 2020-02-17

## 2020-02-17 RX ORDER — PRAVASTATIN SODIUM 20 MG/1
10 TABLET ORAL
Status: DISCONTINUED | OUTPATIENT
Start: 2020-02-17 | End: 2020-02-19 | Stop reason: HOSPADM

## 2020-02-17 RX ORDER — SODIUM CHLORIDE, SODIUM LACTATE, POTASSIUM CHLORIDE, CALCIUM CHLORIDE 600; 310; 30; 20 MG/100ML; MG/100ML; MG/100ML; MG/100ML
150 INJECTION, SOLUTION INTRAVENOUS CONTINUOUS
Status: DISCONTINUED | OUTPATIENT
Start: 2020-02-18 | End: 2020-02-19 | Stop reason: HOSPADM

## 2020-02-17 RX ORDER — VECURONIUM BROMIDE FOR INJECTION 1 MG/ML
INJECTION, POWDER, LYOPHILIZED, FOR SOLUTION INTRAVENOUS AS NEEDED
Status: DISCONTINUED | OUTPATIENT
Start: 2020-02-17 | End: 2020-02-17 | Stop reason: HOSPADM

## 2020-02-17 RX ORDER — ONDANSETRON 2 MG/ML
INJECTION INTRAMUSCULAR; INTRAVENOUS AS NEEDED
Status: DISCONTINUED | OUTPATIENT
Start: 2020-02-17 | End: 2020-02-17 | Stop reason: HOSPADM

## 2020-02-17 RX ORDER — NALOXONE HYDROCHLORIDE 0.4 MG/ML
0.4 INJECTION, SOLUTION INTRAMUSCULAR; INTRAVENOUS; SUBCUTANEOUS AS NEEDED
Status: DISCONTINUED | OUTPATIENT
Start: 2020-02-17 | End: 2020-02-19 | Stop reason: HOSPADM

## 2020-02-17 RX ORDER — CLINDAMYCIN PHOSPHATE 900 MG/50ML
900 INJECTION INTRAVENOUS ONCE
Status: COMPLETED | OUTPATIENT
Start: 2020-02-17 | End: 2020-02-17

## 2020-02-17 RX ORDER — FENTANYL CITRATE 50 UG/ML
50 INJECTION, SOLUTION INTRAMUSCULAR; INTRAVENOUS
Status: DISCONTINUED | OUTPATIENT
Start: 2020-02-17 | End: 2020-02-17 | Stop reason: HOSPADM

## 2020-02-17 RX ORDER — KETOROLAC TROMETHAMINE 30 MG/ML
15 INJECTION, SOLUTION INTRAMUSCULAR; INTRAVENOUS EVERY 6 HOURS
Status: DISCONTINUED | OUTPATIENT
Start: 2020-02-17 | End: 2020-02-19 | Stop reason: ALTCHOICE

## 2020-02-17 RX ORDER — FAMOTIDINE 20 MG/1
20 TABLET, FILM COATED ORAL ONCE
Status: DISCONTINUED | OUTPATIENT
Start: 2020-02-17 | End: 2020-02-17

## 2020-02-17 RX ORDER — MAGNESIUM SULFATE 100 %
4 CRYSTALS MISCELLANEOUS AS NEEDED
Status: DISCONTINUED | OUTPATIENT
Start: 2020-02-17 | End: 2020-02-17 | Stop reason: HOSPADM

## 2020-02-17 RX ORDER — DEXAMETHASONE SODIUM PHOSPHATE 4 MG/ML
INJECTION, SOLUTION INTRA-ARTICULAR; INTRALESIONAL; INTRAMUSCULAR; INTRAVENOUS; SOFT TISSUE AS NEEDED
Status: DISCONTINUED | OUTPATIENT
Start: 2020-02-17 | End: 2020-02-17 | Stop reason: HOSPADM

## 2020-02-17 RX ORDER — SODIUM CHLORIDE 0.9 % (FLUSH) 0.9 %
5-40 SYRINGE (ML) INJECTION EVERY 8 HOURS
Status: DISCONTINUED | OUTPATIENT
Start: 2020-02-17 | End: 2020-02-17 | Stop reason: HOSPADM

## 2020-02-17 RX ORDER — DIPHENHYDRAMINE HYDROCHLORIDE 50 MG/ML
12.5 INJECTION, SOLUTION INTRAMUSCULAR; INTRAVENOUS
Status: DISCONTINUED | OUTPATIENT
Start: 2020-02-17 | End: 2020-02-17 | Stop reason: HOSPADM

## 2020-02-17 RX ORDER — SUCCINYLCHOLINE CHLORIDE 20 MG/ML
INJECTION INTRAMUSCULAR; INTRAVENOUS AS NEEDED
Status: DISCONTINUED | OUTPATIENT
Start: 2020-02-17 | End: 2020-02-17 | Stop reason: HOSPADM

## 2020-02-17 RX ORDER — ROCURONIUM BROMIDE 10 MG/ML
INJECTION, SOLUTION INTRAVENOUS AS NEEDED
Status: DISCONTINUED | OUTPATIENT
Start: 2020-02-17 | End: 2020-02-17

## 2020-02-17 RX ORDER — HYDROMORPHONE HYDROCHLORIDE 1 MG/ML
INJECTION, SOLUTION INTRAMUSCULAR; INTRAVENOUS; SUBCUTANEOUS AS NEEDED
Status: DISCONTINUED | OUTPATIENT
Start: 2020-02-17 | End: 2020-02-17 | Stop reason: HOSPADM

## 2020-02-17 RX ORDER — ACETAMINOPHEN 325 MG/1
650 TABLET ORAL EVERY 6 HOURS
Status: DISCONTINUED | OUTPATIENT
Start: 2020-02-18 | End: 2020-02-19 | Stop reason: HOSPADM

## 2020-02-17 RX ADMIN — SODIUM CHLORIDE, SODIUM LACTATE, POTASSIUM CHLORIDE, AND CALCIUM CHLORIDE: 600; 310; 30; 20 INJECTION, SOLUTION INTRAVENOUS at 16:26

## 2020-02-17 RX ADMIN — NEOSTIGMINE METHYLSULFATE 5 MG: 1 INJECTION INTRAVENOUS at 17:32

## 2020-02-17 RX ADMIN — SUCCINYLCHOLINE CHLORIDE 160 MG: 20 INJECTION, SOLUTION INTRAMUSCULAR; INTRAVENOUS at 14:13

## 2020-02-17 RX ADMIN — CLINDAMYCIN PHOSPHATE 900 MG: 900 INJECTION INTRAVENOUS at 14:06

## 2020-02-17 RX ADMIN — FENTANYL CITRATE 50 MCG: 50 INJECTION, SOLUTION INTRAMUSCULAR; INTRAVENOUS at 17:01

## 2020-02-17 RX ADMIN — FENTANYL CITRATE 100 MCG: 50 INJECTION, SOLUTION INTRAMUSCULAR; INTRAVENOUS at 14:13

## 2020-02-17 RX ADMIN — FENTANYL CITRATE 50 MCG: 50 INJECTION, SOLUTION INTRAMUSCULAR; INTRAVENOUS at 16:52

## 2020-02-17 RX ADMIN — ONDANSETRON 4 MG: 2 INJECTION INTRAMUSCULAR; INTRAVENOUS at 18:05

## 2020-02-17 RX ADMIN — VECURONIUM BROMIDE FOR INJECTION 2 MG: 1 INJECTION, POWDER, LYOPHILIZED, FOR SOLUTION INTRAVENOUS at 16:30

## 2020-02-17 RX ADMIN — HYDROMORPHONE HYDROCHLORIDE 0.5 MG: 1 INJECTION, SOLUTION INTRAMUSCULAR; INTRAVENOUS; SUBCUTANEOUS at 18:40

## 2020-02-17 RX ADMIN — SODIUM CHLORIDE, SODIUM LACTATE, POTASSIUM CHLORIDE, AND CALCIUM CHLORIDE: 600; 310; 30; 20 INJECTION, SOLUTION INTRAVENOUS at 15:32

## 2020-02-17 RX ADMIN — INSULIN LISPRO 6 UNITS: 100 INJECTION, SOLUTION INTRAVENOUS; SUBCUTANEOUS at 18:23

## 2020-02-17 RX ADMIN — ONDANSETRON 4 MG: 2 SOLUTION INTRAMUSCULAR; INTRAVENOUS at 14:06

## 2020-02-17 RX ADMIN — VECURONIUM BROMIDE FOR INJECTION 2 MG: 1 INJECTION, POWDER, LYOPHILIZED, FOR SOLUTION INTRAVENOUS at 15:00

## 2020-02-17 RX ADMIN — HYDROMORPHONE HYDROCHLORIDE 1 MG: 1 INJECTION, SOLUTION INTRAMUSCULAR; INTRAVENOUS; SUBCUTANEOUS at 17:44

## 2020-02-17 RX ADMIN — DEXAMETHASONE SODIUM PHOSPHATE 4 MG: 4 INJECTION, SOLUTION INTRA-ARTICULAR; INTRALESIONAL; INTRAMUSCULAR; INTRAVENOUS; SOFT TISSUE at 14:13

## 2020-02-17 RX ADMIN — ENOXAPARIN SODIUM 40 MG: 40 INJECTION, SOLUTION INTRAVENOUS; SUBCUTANEOUS at 11:42

## 2020-02-17 RX ADMIN — HYDROMORPHONE HYDROCHLORIDE 0.5 MG: 1 INJECTION, SOLUTION INTRAMUSCULAR; INTRAVENOUS; SUBCUTANEOUS at 18:15

## 2020-02-17 RX ADMIN — HYDROMORPHONE HYDROCHLORIDE 0.5 MG: 1 INJECTION, SOLUTION INTRAMUSCULAR; INTRAVENOUS; SUBCUTANEOUS at 18:55

## 2020-02-17 RX ADMIN — GLYCOPYRROLATE 0.4 MG: 0.2 INJECTION INTRAMUSCULAR; INTRAVENOUS at 17:32

## 2020-02-17 RX ADMIN — FAMOTIDINE 20 MG: 10 INJECTION, SOLUTION INTRAVENOUS at 11:41

## 2020-02-17 RX ADMIN — VECURONIUM BROMIDE FOR INJECTION 5 MG: 1 INJECTION, POWDER, LYOPHILIZED, FOR SOLUTION INTRAVENOUS at 14:25

## 2020-02-17 RX ADMIN — GLYCOPYRROLATE 0.2 MG: 0.2 INJECTION INTRAMUSCULAR; INTRAVENOUS at 14:06

## 2020-02-17 RX ADMIN — ONDANSETRON 4 MG: 2 SOLUTION INTRAMUSCULAR; INTRAVENOUS at 14:13

## 2020-02-17 RX ADMIN — FENTANYL CITRATE 50 MCG: 50 INJECTION, SOLUTION INTRAMUSCULAR; INTRAVENOUS at 18:30

## 2020-02-17 RX ADMIN — MIDAZOLAM 2 MG: 1 INJECTION INTRAMUSCULAR; INTRAVENOUS at 14:06

## 2020-02-17 RX ADMIN — FENTANYL CITRATE 50 MCG: 50 INJECTION, SOLUTION INTRAMUSCULAR; INTRAVENOUS at 14:21

## 2020-02-17 RX ADMIN — FENTANYL CITRATE 50 MCG: 50 INJECTION, SOLUTION INTRAMUSCULAR; INTRAVENOUS at 18:05

## 2020-02-17 RX ADMIN — ONDANSETRON 4 MG: 2 SOLUTION INTRAMUSCULAR; INTRAVENOUS at 17:42

## 2020-02-17 RX ADMIN — SODIUM CHLORIDE, SODIUM LACTATE, POTASSIUM CHLORIDE, AND CALCIUM CHLORIDE 150 ML/HR: 600; 310; 30; 20 INJECTION, SOLUTION INTRAVENOUS at 11:38

## 2020-02-17 RX ADMIN — PROPOFOL 200 MG: 10 INJECTION, EMULSION INTRAVENOUS at 14:13

## 2020-02-17 RX ADMIN — VECURONIUM BROMIDE FOR INJECTION 1 MG: 1 INJECTION, POWDER, LYOPHILIZED, FOR SOLUTION INTRAVENOUS at 14:13

## 2020-02-17 RX ADMIN — KETOROLAC TROMETHAMINE 15 MG: 30 INJECTION, SOLUTION INTRAMUSCULAR at 19:14

## 2020-02-17 RX ADMIN — PRAVASTATIN SODIUM 10 MG: 20 TABLET ORAL at 22:05

## 2020-02-17 RX ADMIN — LIDOCAINE HYDROCHLORIDE 100 MG: 20 INJECTION, SOLUTION INTRAVENOUS at 14:13

## 2020-02-17 RX ADMIN — FENTANYL CITRATE 50 MCG: 50 INJECTION, SOLUTION INTRAMUSCULAR; INTRAVENOUS at 15:51

## 2020-02-17 RX ADMIN — SODIUM CHLORIDE, SODIUM LACTATE, POTASSIUM CHLORIDE, AND CALCIUM CHLORIDE 150 ML/HR: 600; 310; 30; 20 INJECTION, SOLUTION INTRAVENOUS at 23:28

## 2020-02-17 NOTE — BRIEF OP NOTE
BRIEF OPERATIVE NOTE    Date of Procedure: 2/17/2020   Preoperative Diagnosis: e66.01   i10   g47.33  e78.5  Postoperative Diagnosis: 1. Super Obesity 2. Chronic Calculous Cholecystitis 3.  Hepatic Steatosis  Procedure(s):  LAPAROSCOPIC Deyanira-En-Y GASTRIC BYPASS , cholecystectomy with IOC and Liver Biopsy  Surgeon(s) and Role:     * Benja Ernst DO - Primary         Surgical Assistant: Alicja Azul    Surgical Staff:  Circ-1: Sherrie Tesfaye RN  Circ-2: Jennifer Boone RN  Radiology Technician: Cristin Javed  Scrub Tech-1: Abdullahi Carlson  Scrub Tech-Relief: Deyanbrianna Pererya  Surg Asst-Relief: Filibertosherrell Jackson  Nurse Practitioner: Ramesh Ramos NP; Arnoldo Platt NP  Event Time In Time Out   Incision Start 1437    Incision Close 1735      Anesthesia: General   Estimated Blood Loss: 25    Specimens:   ID Type Source Tests Collected by Time Destination   1 : Gallbladder Preservative Gallbladder  Benja Ernst DO 2/17/2020 1446 Pathology   2 : LIVER BIOPSY Preservative   Benja Ernst DO 2/17/2020 1718 Pathology      Findings: RAFFI   Complications: None  Implants: * No implants in log *

## 2020-02-17 NOTE — INTERVAL H&P NOTE
H&P Update:  Lewis Hernandez was seen and examined. History and physical has been reviewed. The patient has been examined.  There have been no significant clinical changes since the completion of the originally dated History and Physical.

## 2020-02-18 LAB
ANION GAP SERPL CALC-SCNC: 7 MMOL/L (ref 3–18)
BUN SERPL-MCNC: 26 MG/DL (ref 7–18)
BUN/CREAT SERPL: 21 (ref 12–20)
CALCIUM SERPL-MCNC: 9 MG/DL (ref 8.5–10.1)
CHLORIDE SERPL-SCNC: 104 MMOL/L (ref 100–111)
CO2 SERPL-SCNC: 28 MMOL/L (ref 21–32)
CREAT SERPL-MCNC: 1.21 MG/DL (ref 0.6–1.3)
ERYTHROCYTE [DISTWIDTH] IN BLOOD BY AUTOMATED COUNT: 13.4 % (ref 11.6–14.5)
EST. AVERAGE GLUCOSE BLD GHB EST-MCNC: 174 MG/DL
GLUCOSE BLD STRIP.AUTO-MCNC: 131 MG/DL (ref 70–110)
GLUCOSE BLD STRIP.AUTO-MCNC: 136 MG/DL (ref 70–110)
GLUCOSE BLD STRIP.AUTO-MCNC: 145 MG/DL (ref 70–110)
GLUCOSE BLD STRIP.AUTO-MCNC: 168 MG/DL (ref 70–110)
GLUCOSE SERPL-MCNC: 156 MG/DL (ref 74–99)
HBA1C MFR BLD: 7.7 % (ref 4.2–5.6)
HCT VFR BLD AUTO: 39.4 % (ref 35–45)
HGB BLD-MCNC: 12.8 G/DL (ref 12–16)
MAGNESIUM SERPL-MCNC: 1.9 MG/DL (ref 1.6–2.6)
MCH RBC QN AUTO: 29.9 PG (ref 24–34)
MCHC RBC AUTO-ENTMCNC: 32.5 G/DL (ref 31–37)
MCV RBC AUTO: 92.1 FL (ref 74–97)
PLATELET # BLD AUTO: 361 K/UL (ref 135–420)
PMV BLD AUTO: 10.3 FL (ref 9.2–11.8)
POTASSIUM SERPL-SCNC: 4.5 MMOL/L (ref 3.5–5.5)
RBC # BLD AUTO: 4.28 M/UL (ref 4.2–5.3)
SODIUM SERPL-SCNC: 139 MMOL/L (ref 136–145)
WBC # BLD AUTO: 12.1 K/UL (ref 4.6–13.2)

## 2020-02-18 PROCEDURE — 74011250637 HC RX REV CODE- 250/637: Performed by: SURGERY

## 2020-02-18 PROCEDURE — 74011250636 HC RX REV CODE- 250/636: Performed by: SURGERY

## 2020-02-18 PROCEDURE — 82962 GLUCOSE BLOOD TEST: CPT

## 2020-02-18 PROCEDURE — 65270000029 HC RM PRIVATE

## 2020-02-18 PROCEDURE — 83036 HEMOGLOBIN GLYCOSYLATED A1C: CPT

## 2020-02-18 PROCEDURE — 80048 BASIC METABOLIC PNL TOTAL CA: CPT

## 2020-02-18 PROCEDURE — 85027 COMPLETE CBC AUTOMATED: CPT

## 2020-02-18 PROCEDURE — 77010033678 HC OXYGEN DAILY

## 2020-02-18 PROCEDURE — 83735 ASSAY OF MAGNESIUM: CPT

## 2020-02-18 PROCEDURE — 77030027138 HC INCENT SPIROMETER -A

## 2020-02-18 PROCEDURE — 74011250637 HC RX REV CODE- 250/637: Performed by: NURSE PRACTITIONER

## 2020-02-18 PROCEDURE — 36415 COLL VENOUS BLD VENIPUNCTURE: CPT

## 2020-02-18 PROCEDURE — 74011250636 HC RX REV CODE- 250/636: Performed by: NURSE PRACTITIONER

## 2020-02-18 RX ORDER — DEXTROSE MONOHYDRATE 100 MG/ML
125-250 INJECTION, SOLUTION INTRAVENOUS AS NEEDED
Status: DISCONTINUED | OUTPATIENT
Start: 2020-02-18 | End: 2020-02-19 | Stop reason: HOSPADM

## 2020-02-18 RX ORDER — INSULIN LISPRO 100 [IU]/ML
INJECTION, SOLUTION INTRAVENOUS; SUBCUTANEOUS
Status: DISCONTINUED | OUTPATIENT
Start: 2020-02-18 | End: 2020-02-19 | Stop reason: HOSPADM

## 2020-02-18 RX ORDER — ENOXAPARIN SODIUM 100 MG/ML
40 INJECTION SUBCUTANEOUS EVERY 24 HOURS
Qty: 7 SYRINGE | Refills: 0 | Status: SHIPPED
Start: 2020-02-19

## 2020-02-18 RX ORDER — HYOSCYAMINE SULFATE 0.12 MG/1
0.12 TABLET, ORALLY DISINTEGRATING ORAL
Qty: 12 TAB | Refills: 0 | Status: SHIPPED | OUTPATIENT
Start: 2020-02-18

## 2020-02-18 RX ORDER — HYDROMORPHONE HYDROCHLORIDE 1 MG/ML
0.5 INJECTION, SOLUTION INTRAMUSCULAR; INTRAVENOUS; SUBCUTANEOUS ONCE
Status: DISCONTINUED | OUTPATIENT
Start: 2020-02-18 | End: 2020-02-18

## 2020-02-18 RX ORDER — HYDROXYZINE HYDROCHLORIDE 10 MG/1
10 TABLET, FILM COATED ORAL
Status: DISCONTINUED | OUTPATIENT
Start: 2020-02-18 | End: 2020-02-18

## 2020-02-18 RX ORDER — HYOSCYAMINE SULFATE 0.12 MG/1
0.12 TABLET, ORALLY DISINTEGRATING ORAL
Status: DISCONTINUED | OUTPATIENT
Start: 2020-02-18 | End: 2020-02-19 | Stop reason: HOSPADM

## 2020-02-18 RX ORDER — METFORMIN HYDROCHLORIDE 500 MG/1
500 TABLET ORAL 2 TIMES DAILY WITH MEALS
Qty: 60 TAB | Refills: 0 | Status: SHIPPED | OUTPATIENT
Start: 2020-02-18 | End: 2020-03-19

## 2020-02-18 RX ORDER — HYDROMORPHONE HYDROCHLORIDE 1 MG/ML
0.5 INJECTION, SOLUTION INTRAMUSCULAR; INTRAVENOUS; SUBCUTANEOUS
Status: DISCONTINUED | OUTPATIENT
Start: 2020-02-18 | End: 2020-02-19 | Stop reason: HOSPADM

## 2020-02-18 RX ORDER — HYDROMORPHONE HYDROCHLORIDE 1 MG/ML
1 INJECTION, SOLUTION INTRAMUSCULAR; INTRAVENOUS; SUBCUTANEOUS ONCE
Status: COMPLETED | OUTPATIENT
Start: 2020-02-18 | End: 2020-02-18

## 2020-02-18 RX ORDER — ENOXAPARIN SODIUM 100 MG/ML
40 INJECTION SUBCUTANEOUS EVERY 24 HOURS
Status: DISCONTINUED | OUTPATIENT
Start: 2020-02-18 | End: 2020-02-19 | Stop reason: HOSPADM

## 2020-02-18 RX ORDER — LORAZEPAM 0.5 MG/1
0.5 TABLET ORAL
Status: DISCONTINUED | OUTPATIENT
Start: 2020-02-18 | End: 2020-02-19 | Stop reason: HOSPADM

## 2020-02-18 RX ORDER — ENOXAPARIN SODIUM 100 MG/ML
40 INJECTION SUBCUTANEOUS DAILY
Qty: 7 SYRINGE | Refills: 0 | OUTPATIENT
Start: 2020-02-18 | End: 2020-02-19

## 2020-02-18 RX ORDER — ONDANSETRON 4 MG/1
4 TABLET, ORALLY DISINTEGRATING ORAL
Qty: 15 TAB | Refills: 0 | Status: SHIPPED | OUTPATIENT
Start: 2020-02-18

## 2020-02-18 RX ORDER — NALOXONE HYDROCHLORIDE 4 MG/.1ML
SPRAY NASAL
Qty: 2 EACH | Refills: 0 | Status: SHIPPED | OUTPATIENT
Start: 2020-02-18

## 2020-02-18 RX ORDER — MAGNESIUM SULFATE 100 %
4 CRYSTALS MISCELLANEOUS AS NEEDED
Status: DISCONTINUED | OUTPATIENT
Start: 2020-02-18 | End: 2020-02-19 | Stop reason: HOSPADM

## 2020-02-18 RX ORDER — OXYCODONE AND ACETAMINOPHEN 7.5; 325 MG/1; MG/1
1 TABLET ORAL
Status: DISCONTINUED | OUTPATIENT
Start: 2020-02-18 | End: 2020-02-19 | Stop reason: HOSPADM

## 2020-02-18 RX ORDER — LORAZEPAM 0.5 MG/1
0.5 TABLET ORAL ONCE
Status: COMPLETED | OUTPATIENT
Start: 2020-02-18 | End: 2020-02-18

## 2020-02-18 RX ADMIN — ACETAMINOPHEN 650 MG: 325 TABLET, FILM COATED ORAL at 17:56

## 2020-02-18 RX ADMIN — ACETAMINOPHEN 650 MG: 325 TABLET, FILM COATED ORAL at 00:38

## 2020-02-18 RX ADMIN — LORAZEPAM 0.5 MG: 0.5 TABLET ORAL at 10:16

## 2020-02-18 RX ADMIN — ACETAMINOPHEN 650 MG: 325 TABLET, FILM COATED ORAL at 06:18

## 2020-02-18 RX ADMIN — OXYCODONE HYDROCHLORIDE AND ACETAMINOPHEN 1 TABLET: 7.5; 325 TABLET ORAL at 09:14

## 2020-02-18 RX ADMIN — ENOXAPARIN SODIUM 40 MG: 40 INJECTION SUBCUTANEOUS at 10:16

## 2020-02-18 RX ADMIN — DULOXETINE HYDROCHLORIDE 60 MG: 30 CAPSULE, DELAYED RELEASE ORAL at 09:14

## 2020-02-18 RX ADMIN — KETOROLAC TROMETHAMINE 15 MG: 30 INJECTION, SOLUTION INTRAMUSCULAR at 06:18

## 2020-02-18 RX ADMIN — PRAVASTATIN SODIUM 10 MG: 20 TABLET ORAL at 22:35

## 2020-02-18 RX ADMIN — SODIUM CHLORIDE, SODIUM LACTATE, POTASSIUM CHLORIDE, AND CALCIUM CHLORIDE 150 ML/HR: 600; 310; 30; 20 INJECTION, SOLUTION INTRAVENOUS at 20:07

## 2020-02-18 RX ADMIN — KETOROLAC TROMETHAMINE 15 MG: 30 INJECTION, SOLUTION INTRAMUSCULAR at 12:04

## 2020-02-18 RX ADMIN — OXYCODONE HYDROCHLORIDE AND ACETAMINOPHEN 1 TABLET: 7.5; 325 TABLET ORAL at 18:46

## 2020-02-18 RX ADMIN — KETOROLAC TROMETHAMINE 15 MG: 30 INJECTION, SOLUTION INTRAMUSCULAR at 00:38

## 2020-02-18 RX ADMIN — HYDROMORPHONE HYDROCHLORIDE 1 MG: 1 INJECTION, SOLUTION INTRAMUSCULAR; INTRAVENOUS; SUBCUTANEOUS at 02:23

## 2020-02-18 RX ADMIN — OXYCODONE HYDROCHLORIDE AND ACETAMINOPHEN 1 TABLET: 7.5; 325 TABLET ORAL at 13:36

## 2020-02-18 RX ADMIN — SODIUM CHLORIDE, SODIUM LACTATE, POTASSIUM CHLORIDE, AND CALCIUM CHLORIDE 150 ML/HR: 600; 310; 30; 20 INJECTION, SOLUTION INTRAVENOUS at 06:12

## 2020-02-18 RX ADMIN — HYOSCYAMINE SULFATE 0.12 MG: 0.12 TABLET, ORALLY DISINTEGRATING ORAL at 14:43

## 2020-02-18 RX ADMIN — OXYCODONE HYDROCHLORIDE AND ACETAMINOPHEN 1 TABLET: 7.5; 325 TABLET ORAL at 22:35

## 2020-02-18 RX ADMIN — ACETAMINOPHEN 650 MG: 325 TABLET, FILM COATED ORAL at 12:15

## 2020-02-18 RX ADMIN — KETOROLAC TROMETHAMINE 15 MG: 30 INJECTION, SOLUTION INTRAMUSCULAR at 17:56

## 2020-02-18 NOTE — PROGRESS NOTES
9041  Received pt just walk in the hallway,  Seen by MD due to pain,  Right side of abdomen incision site is bruise, liam and monitor. 0920  Started on  Percoset    1100  Been  On and off crying seen and talk by MD, PA and coordiinator, calm down now. 36   Was crying, was on the phone calling  Her  saying she is in pain wants to go home, VS taken. Medicated with  Percocet and to call MD, she said her Percocet not helping her and still she is in pain. 1400  Been up  Walking in the hallway several times, was  seen more comfortable until  right now that she is crying, called PA Ms Joe Bailey, no other pain med order but to give her [address], explained to pt at this time, she stop crying. 1600  Per   Cathryn Leung, she is not going home due to uncontrolled pain, seen pt, there is an order for tonight for Dilaudid if needed  Prn  only and so with Ativan, was not discharge due to uncontrolled pain. 200  I  Was told by PA, the one time Dilaudid for tonight prn not at 1700. She was given Toradol and  Tylenol and in one hour to be given Oxycodone.

## 2020-02-18 NOTE — PROGRESS NOTES
Patient continues to have pain during the day that is not controlled at times. We will work on pain management and goal of discharge tomorrow.

## 2020-02-18 NOTE — PROGRESS NOTES
Problem: Discharge Planning  Goal: *Discharge to safe environment  Outcome: Resolved/Met   Plan home    Reason for Admission:  Gastric bypass                     RUR Score:    6%                 Plan for utilizing home health: no                         Current Advanced Directive/Advance Care Plan:  none                         Transition of Care Plan:  Spoke with pt, lives with spouse. Spouse is active duty. Independent with adls and amb. Has bipap at home. no other dme. Spouse will transport home. Demographics correct. pcp DR Radha Cervantes, saw 1 mo ago    Plan home. Patient has designated __________spouse______________ to participate in his/her discharge plan and to receive any needed information. Name: carolyn nowak  Address:  Phone number: [phone]    Care Management Interventions  PCP Verified by CM: Yes  Palliative Care Criteria Met (RRAT>21 & CHF Dx)?: No  Mode of Transport at Discharge:  Other (see comment)  Transition of Care Consult (CM Consult): Discharge Planning  Discharge Durable Medical Equipment: No  Physical Therapy Consult: No  Occupational Therapy Consult: No  Speech Therapy Consult: No  Current Support Network: Lives with Spouse  Confirm Follow Up Transport: Family  Discharge Location  Discharge Placement: Home

## 2020-02-18 NOTE — ROUTINE PROCESS
R/T [employer]. Lap sites D/I x 7. Abd soft. Pt dozing after multiple doses of pain meds with 2LNC. CPAP on bed and checked by biomed. VSS.   To 2204 on bed by transport in stable condiiotion

## 2020-02-18 NOTE — ROUTINE PROCESS
Attempted to ambulate patient outside of the room. Patient ambulated from her room almost to the nurse's station. Crying hysterically. Noted some drops of blood on the floor. Ambulated patient back to her room. Steri-strips re-enforce in the right lower quadrant of abdomen. Patient tolerated well. Able to stop the oozing. Noted patient was not crying hysterically during process. Band-aid also changed to umbilical area.     Liliana Powell RN, BSN

## 2020-02-18 NOTE — PROGRESS NOTES
Problem: Falls - Risk of  Goal: *Absence of Falls  Description  Document Mark Pinedo Fall Risk and appropriate interventions in the flowsheet.   Outcome: Progressing Towards Goal  Note: Fall Risk Interventions:  Mobility Interventions: Patient to call before getting OOB         Medication Interventions: Patient to call before getting OOB, Teach patient to arise slowly    Elimination Interventions: Call light in reach, Patient to call for help with toileting needs              Problem: Patient Education: Go to Patient Education Activity  Goal: Patient/Family Education  Outcome: Progressing Towards Goal     Problem: Pain  Goal: *Control of Pain  Outcome: Progressing Towards Goal     Problem: Laparoscopic Gastric Bypass:Day of Surgery  Goal: Off Pathway (Use only if patient is Off Pathway)  Outcome: Progressing Towards Goal  Goal: Activity/Safety  Outcome: Progressing Towards Goal  Goal: Consults, if ordered  Outcome: Progressing Towards Goal  Goal: Diagnostic Test/Procedures  Outcome: Progressing Towards Goal  Goal: Nutrition/Diet  Outcome: Progressing Towards Goal  Goal: Medications  Outcome: Progressing Towards Goal  Goal: Respiratory  Outcome: Progressing Towards Goal  Goal: Treatments/Interventions/Procedures  Outcome: Progressing Towards Goal  Goal: Psychosocial  Outcome: Progressing Towards Goal  Goal: *No signs and symptoms of infection or wound complications  Outcome: Progressing Towards Goal  Goal: *Optimal pain control at patient's stated goal  Outcome: Progressing Towards Goal  Goal: *Adequate urinary output (equal to or greater than 30 milliliters/hour)  Outcome: Progressing Towards Goal  Goal: *Hemodynamically stable  Outcome: Progressing Towards Goal  Goal: *Tolerating diet  Outcome: Progressing Towards Goal  Goal: *Demonstrates progressive activity  Outcome: Progressing Towards Goal  Goal: *Absence of signs and symptoms of DVT  Outcome: Progressing Towards Goal  Goal: *Labs within defined limits  Outcome: Progressing Towards Goal  Goal: *Oxygen saturation within defined limits  Outcome: Progressing Towards Goal     Problem: Laparoscopic Gastric Bypass:Post-Op Day 1  Goal: Off Pathway (Use only if patient is Off Pathway)  Outcome: Progressing Towards Goal  Goal: Activity/Safety  Outcome: Progressing Towards Goal  Goal: Diagnostic Test/Procedures  Outcome: Progressing Towards Goal  Goal: Nutrition/Diet  Outcome: Progressing Towards Goal  Goal: Discharge Planning  Outcome: Progressing Towards Goal  Goal: Medications  Outcome: Progressing Towards Goal  Goal: Respiratory  Outcome: Progressing Towards Goal  Goal: Treatments/Interventions/Procedures  Outcome: Progressing Towards Goal  Goal: Psychosocial  Outcome: Progressing Towards Goal  Goal: *No signs and symptoms of infection or wound complications  Outcome: Progressing Towards Goal  Goal: *Optimal pain control at patient's stated goal  Outcome: Progressing Towards Goal  Goal: *Adequate urinary output (equal to or greater than 30 milliliters/hour)  Outcome: Progressing Towards Goal  Goal: *Hemodynamically stable  Outcome: Progressing Towards Goal  Goal: *Tolerating diet  Outcome: Progressing Towards Goal  Goal: *Demonstrates progressive activity  Outcome: Progressing Towards Goal  Goal: *Absence of signs and symptoms of DVT  Outcome: Progressing Towards Goal  Goal: *Labs within defined limits  Outcome: Progressing Towards Goal  Goal: *Oxygen saturation within defined limits  Outcome: Progressing Towards Goal  Goal: *Upper GI series/No leak identified  Outcome: Progressing Towards Goal     Problem: Laparoscopic Gastric Bypass:Post-Op Day 2  Goal: Off Pathway (Use only if patient is Off Pathway)  Outcome: Progressing Towards Goal  Goal: Activity/Safety  Outcome: Progressing Towards Goal  Goal: Diagnostic Test/Procedures  Outcome: Progressing Towards Goal  Goal: Nutrition/Diet  Outcome: Progressing Towards Goal  Goal: Discharge Planning  Outcome: Progressing Towards Goal  Goal: Medications  Outcome: Progressing Towards Goal  Goal: Respiratory  Outcome: Progressing Towards Goal  Goal: Treatments/Interventions/Procedures  Outcome: Progressing Towards Goal  Goal: Psychosocial  Outcome: Progressing Towards Goal     Problem: Laparoscopic Gastric Bypass:Discharge Outcomes  Goal: *Active bowel sounds  Outcome: Progressing Towards Goal  Goal: *Absence of signs and symptoms of DVT  Outcome: Progressing Towards Goal  Goal: *Hemodynamically stable  Outcome: Progressing Towards Goal  Goal: *Lungs clear or at baseline  Outcome: Progressing Towards Goal  Goal: *Demonstrates independent activity or return to baseline  Outcome: Progressing Towards Goal  Goal: *Optimal pain control at patient's stated goal  Outcome: Progressing Towards Goal  Goal: *Verbalizes understanding and describes prescribed diet  Outcome: Progressing Towards Goal  Goal: *Tolerating diet  Outcome: Progressing Towards Goal  Goal: *Verbalizes name, dosage, time, side effects, and number of days to continue medications  Outcome: Progressing Towards Goal  Goal: *No signs and symptoms of infection or wound complications  Outcome: Progressing Towards Goal  Goal: *Anxiety reduced or absent  Outcome: Progressing Towards Goal  Goal: *Understands and describes signs and symptoms to report to providers (eg: s/s of leak, DVT; inability to tolerate diet)  Outcome: Progressing Towards Goal  Goal: *Describes follow-up/return visits to physicians  Outcome: Progressing Towards Goal  Goal: *Describes available resources and support systems  Outcome: Progressing Towards Goal     Problem: High Risk or History of JESSICA  Goal: Recognition of JESSICA or High Risk for JESSICA  Outcome: Progressing Towards Goal  Goal: Maintain Patent Airway and Adequate Oxygenation  Outcome: Progressing Towards Goal  Goal: Avoid Over-sedation  Outcome: Progressing Towards Goal  Goal: Maintenance Care of JESSICA  Outcome: Progressing Towards Goal

## 2020-02-18 NOTE — PROGRESS NOTES
Surgery Progress Note    2/18/2020    Admit Date: 2/17/2020    Subjective:     Patient has complaints of pain Pain is not controlled with current regimen. Patient has been ambulating in halls. She reports no nausea and no vomiting and is tolerating ice chips well. Voiding spontaneously, pain was not well managed overnight, patient tearful on exam.     Objective:     Blood pressure 112/81, pulse 99, temperature 97.6 °F (36.4 °C), resp. rate 17, height 5' 4\" (1.626 m), weight 142 kg (313 lb), SpO2 94 %. No intake/output data recorded. 02/16 1901 - 02/18 0700  In: 1300 [I.V.:1300]  Out: 56 [Urine:465]    EXAM: GENERAL: alert, pleasant, no distress   HEART: regular rate and rhythm   LUNGS: clear to auscultation   ABDOMEN:  Soft, obese, appropriately tender, nondistended, incisions clean, dry, mild bruising at incision sites without active bleeding or drainage.     EXTREMITIES: warm, well perfused    Data Review    Recent Results (from the past 24 hour(s))   HCG URINE, QL. - POC    Collection Time: 02/17/20 10:55 AM   Result Value Ref Range    Pregnancy test,urine (POC) NEGATIVE  NEG     GLUCOSE, POC    Collection Time: 02/17/20 11:37 AM   Result Value Ref Range    Glucose (POC) 142 (H) 70 - 110 mg/dL   GLUCOSE, POC    Collection Time: 02/17/20  6:20 PM   Result Value Ref Range    Glucose (POC) 205 (H) 70 - 110 mg/dL   GLUCOSE, POC    Collection Time: 02/18/20 12:45 AM   Result Value Ref Range    Glucose (POC) 168 (H) 70 - 110 mg/dL   CBC W/O DIFF    Collection Time: 02/18/20  3:45 AM   Result Value Ref Range    WBC 12.1 4.6 - 13.2 K/uL    RBC 4.28 4.20 - 5.30 M/uL    HGB 12.8 12.0 - 16.0 g/dL    HCT 39.4 35.0 - 45.0 %    MCV 92.1 74.0 - 97.0 FL    MCH 29.9 24.0 - 34.0 PG    MCHC 32.5 31.0 - 37.0 g/dL    RDW 13.4 11.6 - 14.5 %    PLATELET 220 007 - 408 K/uL    MPV 10.3 9.2 - 71.8 FL   METABOLIC PANEL, BASIC    Collection Time: 02/18/20  3:45 AM   Result Value Ref Range    Sodium 139 136 - 145 mmol/L    Potassium 4.5 3.5 - 5.5 mmol/L    Chloride 104 100 - 111 mmol/L    CO2 28 21 - 32 mmol/L    Anion gap 7 3.0 - 18 mmol/L    Glucose 156 (H) 74 - 99 mg/dL    BUN 26 (H) 7.0 - 18 MG/DL    Creatinine 1.21 0.6 - 1.3 MG/DL    BUN/Creatinine ratio 21 (H) 12 - 20      GFR est AA 59 (L) >60 ml/min/1.73m2    GFR est non-AA 49 (L) >60 ml/min/1.73m2    Calcium 9.0 8.5 - 10.1 MG/DL   MAGNESIUM    Collection Time: 02/18/20  3:45 AM   Result Value Ref Range    Magnesium 1.9 1.6 - 2.6 mg/dL   GLUCOSE, POC    Collection Time: 02/18/20  6:23 AM   Result Value Ref Range    Glucose (POC) 136 (H) 70 - 110 mg/dL       Assessment:   Reema Chin is a 45 y.o. female, postop day 1 status post laparoscopic gastric bypass surgery. Condition: good    Plan:   -Ambulate every four hours  -Percocet 7.5mg 1 tabs po every 4-6 hour prn pain uncontrolled by tylenol  -Advance to Clear liquid Gastric Bypass Diet, if able to tolerate clear liquid diet 4oz per hour one of which being a protein supplement, will discharge home later today if pain is controlled.

## 2020-02-18 NOTE — PERIOP NOTES
Discussed excessive c/o pain despite multiple doses of Dilaudid. Toradol ordered and given per Dr Odin Presley. Pt nw snoring but states \"severe\" occasionally when awake.

## 2020-02-18 NOTE — ROUTINE PROCESS
Attempted to calm patient down with relaxation techniques. Patient uncooperative. States that she takes percocet and motrin several times a day and is requesting something stronger for pain. Patient only took three out of the four pieces of acetaminophen at the bedside. Patient stated that she should be on a drip or PCA pump for the pain so that she can be knocked out. Notified patient that she does need to get up and ambulate. Patient stated that she wants something stronger for pain. Notified patient that the MD will have to be notified. Patient stated that she has narcotics in her overnight bag and she will take her own personal medications. Nursing supervisor notified. Charge nurse and another nurse went into the patient's room to confiscate personal medications. Patient refused to allow medications to be taken to the pharmacy. Patient stated that she has called her  and he will handle this situation. Nursing supervisor at the bedside to assist in calming patient down. MD paged and notified of situation. Telephone order for Dilaudid 1 mg intravenously one time dose. Spouse on the unit and updated. Spouse stated that medications in the patient's overnight bag included vitamins and other scheduled prescribed medications. Spouse does not recall percocet being in the patient's overnight bag. Spouse assisted nursing supervisor in calming patient down and getting her to ambulate. Upon entering the room, noted patient ambulating in the room with nursing supervisor and spouse at the bedside. Patient able to burp. Dilaudid administered as ordered. 80  Spouse able to get patient to ambulate outside of the room.     Mira Encarnacion, RN, BSN

## 2020-02-18 NOTE — PROGRESS NOTES
conducted an initial consultation and Spiritual Assessment for Toya Ruth, who is a 45 y.o.,female. Patients Primary Language is: [place]. According to the patients EMR [facility] Affiliation is: No preference. The reason the Patient came to the hospital is:   Patient Active Problem List    Diagnosis Date Noted    Morbid obesity with BMI of 50.0-59.9, adult ([facility] [address] 02/17/2020    Obesity, morbid ([facility] [address] 10/08/2019        The  provided the following Interventions:  Initiated a relationship of care and support with patient in room 2204 this morning on this day one post surgery. Listened empathically as patient shared her story of surgery and hopes for this new venture she has undertaken. Patient related that she was still having some issues with pain and Gas from surgery. Provided information about Spiritual Care Services. Offered prayer and assurance of continued prayers on patients behalf. The following outcomes were achieved:  Patient shared limited information about her medical narrative and spiritual journey/beliefs. Patient processed feeling about current hospitalization. Patient expressed gratitude for pastoral care visit. Assessment:  Patient does not have any Quaker/cultural needs that will affect patients preferences in health care. There are no further spiritual or Quaker issues which require Spiritual Care Services interventions at this time. Plan:  Chaplains will continue to follow and will provide pastoral care on an as needed/requested basis    . Ursula Vargas   Spiritual Care   [phone]

## 2020-02-18 NOTE — ROUTINE PROCESS
Received patient from PACU. Alert and oriented times four. Ambulated to bathroom. Noted clear yellow urine. Patient demanding to get changed from hospital gown to regular clothing. Assisted by spouse. Noted patient falling asleep during assessment. Noted 7 troc sites with steri-strips and band-aids. Some oozing from lower two sites. Attempted to re-enforce with new steri-strips and band-aids. Patient refused at this time. MD on the unit. Updated face to face regarding concerns. MD stated that he is well aware.     Sunita Quesada, RN, BSN

## 2020-02-18 NOTE — ROUTINE PROCESS
Noted that patient's pulse oximetry dropped below 88%. Patient is not wearing her CPAP at this time. Asked patient if she needed assistance with CPAP. Patient stated that she does not want to wear CPAP at this time because it fills her stomach with air and she is having anxiety about CPAP possibly causing complications post-operatively. Telemetry notified.     Amairani Kan RN, BSN

## 2020-02-18 NOTE — PROGRESS NOTES
She was educated on discharge instructions below. She said she understood and she has a copy. She knows who to call for any issues post discharge. Hydration  Hydration is your NUMBER ONE priority. Dehydration is the most common reason for readmission to the hospital. Dehydration occurs when  your body does not get enough fluid to keep it functioning at its best. Your body also requires fluid  to burn its stored fat calories for energy. Carry a bottle of water with you all day, even when you are away from home; remind yourself to  drink even if you dont feel thirsty. Drinking 64 ounces of fluid is your daily goal. You can tell if  youre getting enough fluid if youre making clear, light-colored urine five to 10 times per day. Signs of dehydration can be thirst, headache, hard stools or dizziness upon sitting or standing up. You should contact your surgeons office if you are unable to drink enough fluid to stay hydrated. --   [address] after Surgery   No lifting over 15 pounds for four weeks.  No driving while taking the pain medication (about seven to 10 days).  No tub baths, swimming or hot tubs until incisions are healed (about two weeks).  You may shower. Clean incisions daily /gently with soap and check incisions for signs of infection:  -- Redness around incision. -- Swelling at site. -- Drainage with an foul odor (pus). -- Increase tenderness around incision.  Take your temperature and resting pulse in the morning and evening. Record on tracking form  given to you. Call if your temperature is greater than 101 or your pulse rate is greater than 115.  Please contact your surgeon if you are having excessive abdominal pain (that lasts longer than  four hours and does not improve with prescribed pain medication), vomiting or shortness of breath.  Get up and move -- do not sit in one place for more than an hour.    You need to WALK (EXERCISE) for 30 minutes per day. -- Walking around your house does not count. -- Bike, treadmill and elliptical are OK. -- NO weight lifting or sit ups.  If constipated take an adult dose of Miralax (available over the counter). Contact the doctors  office if Miralax doesnt help.  You may swallow pills starting the day after surgery as long as they fit inside this [place]:   Continue to use your incentive spirometer (breathing machine) for the next couple of weeks to  help prevent pneumonia. [address]  Temperature/Heart Rate Log  Take your temperature and heart rate (pulse) twice a day for 14 days. Take both in the morning and  evening at about the same time each day (when you wake up and before you go to bed when you  are relaxed). Please contact your doctors office if your:   Temperature is higher than 101 degrees.  Heart rate (pulse) is higher than 115 beats per minute  (normal heart rate is 60 to 100 beats per minute). How to Take Your Heart Rate (Pulse)   Turn your left hand so that your palm is face-up.  With the index and middle fingers of your right  hand, draw a line from the base of your thumb to  just below the crease in your wrist. Your fingers  should nestle just to the left of the large tendon that  pops up when you bend your wrist toward you.  Dont press too hard, that will make the pulse go  away. Use gentle pressure.  Wait. It can take several seconds -- and several micro-adjustments in the placement of your two  fingers on your wrist -- to find your pulse. Just keep moving your fingers down or up your wrist  in small increments (and pausing for a few seconds) until you find it. How to Take Your Pulse Rate   Find a watch with a second hand and place it on your right wrist or on the table next  to your left hand.  After finding your pulse, count the number of beats for 20 seconds.    Multiply by three to get your heart rate, or beats per minute  (or just count for 60 seconds for a math-free option).  Normal, resting heart rate is about 60 to 100 beats per minute. -- 55 --  PATIENT GUIDE TO [address]  Lovenox Self Injection Guide  Prepare  Step 1: Wash and dry your hands thoroughly. Step 2: Sit or lie in a comfortable position and choose an area  on the right or left side of the abdomen at least two inches away  from the belly button. Step 3: Clean the injection site with an alcohol swab and let dry. Inject  Step 4: Remove the needle cap by pulling it straight off the syringe and  discard it in a sharps . Step 5: With your other hand, pinch an inch of the cleansed area to  make a fold in the skin. Insert the full length of the needle straight  down -- at a 90? angle -- into the fold of skin. -- 50 --  PATIENT GUIDE TO [address]  Step 6: Press the plunger with your thumb until the syringe is empty. Then pull the needle straight out and release the skin fold. Dispose  Step 7: Point the needle down and away from yourself and others,  and then push down on the plunger to activate the safety shield. Step 8: Place the used syringe in the sharps . Do NOT expel the air bubble from the syringe before the injection. Administration should be alternated between the left and right abdominal wall. The whole length  of the needle should be introduced into a skin fold held between the thumb and forefinger; the  skin fold should be held throughout the injection. To minimize bruising, do not rub the injection  site after completion of the injection. Questions about LOVENOX? Call [phone]  -- 49 --  PATIENT GUIDE TO [address] [place] [address]  9.  DIET AND LIFESTYLE  Key Diet Principles Following Bariatric Surgery   Begin each meal with soft moist high protein foods (i.e. chicken, turkey, yogurt, tuna, eggs,  cottage cheese, other fish and seafood).  Consume a minimum of 64 ounces of fluid each day to prevent dehydration. No straws.  No food and fluid together. Stop drinking 30 minutes before a meal. You may begin fluids again  30 minutes after you finish a meal.   Eat very slowly and chew all foods completely.  Keep portions small.  No simple sugars or high fat foods. No carbonated beverages. No caffeine.  Eat three meals per day. No skipping. Avoid snacking between meals.  No alcohol. No smoking.  Two Flintstones Complete Chewable vitamins each day. Take one in the morning and one at night.  1,500 milligrams Calcium Citrate per day in separate dosages.  Vitamin D 3: 5,000 IU taken per day.  Vitamin B-12: Take 1,000 micrograms sublingually daily.  Iron: 60 milligrams per day from [facility] [employer].  Protein supplements of your choice. Must be low sugar (0 to 3 grams), low fat (0 to 3 grams) and  provide at least 35 to 40 grams of protein each day. You need 60 to 70 grams of protein (food  and supplements) each day.  Minimum of 30 minutes of physical activity daily. Do not [place] NSAIDS . Do not take Steroids without your surgeons permission. -- 48 --  [facility]  Your Priorities After Surgery  ? Fluid: 64 ounces of fluid per day. ? Protein: 60 to 70 grams of protein per day. ? Walk every day. Clear Liquid Diet  One week of clear liquids: minimum of 64 ounces of fluid per day. Fluid:   Zero calorie liquids.  No caffeine.  No carbonation.  No sugary drinks.  No alcohol.  No straws. Food   Protein drinks.  Less than 3 grams of sugar and  3 grams of fat per serving.  Protein drink should provide you with  60 to 70 grams of protein. Soft Protein Diet  Five weeks of soft protein (1 ounce for soft protein, 3 ounces of yogurt/cottage cheese). Three meals per day and 1 protein shake.  Protein shakes should provide you with 30 grams of  protein on the soft protein diet. Slow transition:   First week on soft protein diet -- focus on yogurt, cottage cheese, eggs, vegetarian refried beans,  black beans, kidney beans and white beans. (NO BAKED BEANS.)   Second through fourth week on soft protein diet -- focus on yogurt, cottage cheese, eggs,  canned tuna, canned chicken, tilapia and fish (needs to be soft enough to be cut up with a fork).  Fifth week on soft protein diet -- focus on yogurt, cottage cheese, eggs, canned tuna, canned  chicken, tilapia, fish, salmon, chicken breast or turkey. Fluid is your #1 Priority! Continue clear liquids between meals. You will need 64 ounces of fluid per day. Fluids that you can have include:   Water.  Zero calorie liquids. You will need to sip throughout the day and should therefore have a water bottle with you at all  times! No liquids with your meals. Stop 30 minutes before a meal and wait 30 minutes after a meal.  No straws. Zero calorie liquids. No caffeine. No carbonation. No sugary drinks. No alcohol. -- 46 --  60 [facility]  Protein  You will need 60 to 70 grams of protein per day.  60 to 70 grams of protein shakes when on the clear liquid diet (two to three shakes per day).  30 to 50 grams of protein shakes when on the soft protein diet (one shake per day). Eat Three Times Per Day  You will need to eat three times per day. My planned times are:  _________________________________________________________  _________________________________________________________  _________________________________________________________  Nausea, Vomiting, Stomach Pain  If you have problems with nausea, vomiting or stomach pain, try:   Eating slowly: 20 to 30 minutes per meal.   Chewing food thoroughly: 20 to 30 chews before food is swallowed.  Small portions: measure portions in medicine cup.  Stopping before feeling full.    AVOIDING SUGAR and FRIED FOOD: sugar will cause dumping syndrome and lead to weight gain. Exercise  I will need to get a minimum of 30 minutes of exercise per day or 150 minutes of exercise per week.  Walking, swimming, biking or elliptical.   Find something you enjoy! Vitamins  After surgery, you will need to take the following vitamins for the rest of your life -- FOREVER.  Vitamin D 3: 5,000 IU per day.  Calcium Citrate: 1,500 milligrams, taken separately.  Flintstones Complete: two per day, taken separately.  Sublingual Vitamin B-12: 1,000 micrograms daily.  Iron for menstruating women or patients with a history of low iron: 65 milligrams daily. We recommend going to www[employer] and purchasing iron from there. The lemon-lime has 60 milligrams. This iron is better absorbed than over-the-counter iron. -- 46 --  PATIENT GUIDE TO BARIATRIC [address] [place] [address] Rd  Clear Liquid Log  Getting your fluid in is top priority during this week. Fluids (MINIUM of 64 ounces per day):  ? 8 oz. ? 8 oz. ? 8 oz. ? 8 oz. ? 8 oz. ? 8 oz. ? 8 oz. ? 8 oz. ? 8 oz. ? 8 oz. ? 8 oz. ? 8 oz. Flintstones Complete Chewable: ? a.m. ? p.m. Calcium Citrate (1,500 milligrams/day):  Pill form  ? Two crushed pills (morning) ? Two crushed pills (afternoon) ? Two crushed pills (evening)  OR Upcal D (powder)  ? One pack/scoop ? One pack/scoop ? One pack/scoop  OR Celebrate Chewable Vitamins (500 mg each) or Bariatric Advantage Chewables (500 mg)  ? One chewable (morning) ? One chewable (afternoon) ? One chewable (evening)  OR Liquid Calcium Citrate  ? 1 tbsp. Calcium Citrate ? 1 tbsp. Calcium Citrate ? 1 tbsp. Calcium Citrate  Vitamin D3: ? 5,000 IU daily. Vitamin B-12: ? 1,000 micrograms per day. Iron (menstruating women or patients with a history of low iron):  ? 60 milligrams per day from [facility] [employer]. Protein drinks (protein drinks should be under 3 grams of sugar and 3 grams of fat).   Protein shake (60 grams per day): ? a.m. ? p.m. Exercise: ? 30 to 40 minutes per day. -- 48 --  PATIENT GUIDE TO BARIATRIC [address]  Bariatric Soft and Moist Diet Shopping List   alcium Citrate (1,500 milligrams/day):  Pill form  ? Two crushed pills (morning) ? Two crushed pills (afternoon) ? Two crushed pills (evening)  OR Upcal D (powder)  ? One pack/scoop ? One pack/scoop ? One pack/scoop  OR Celebrate Chewable Vitamins (500 mg each) or Bariatric Advantage Chewables (500 mg)  ? One chewable (morning) ? One chewable (afternoon) ? One chewable (evening)  OR Liquid Calcium Citrate  ? 1 tbsp. Calcium Citrate ? 1 tbsp. Calcium Citrate ? 1 tbsp. Calcium Citrate  Vitamin D3: ? 5,000 IU daily  Vitamin B-12: ? 1,000 micrograms per day  Iron (menstruating women or  patients with a history of low iron):  ? 60 milligrams per day  from [facility] [employer]  Protein drinks (protein drinks should be under  3 grams of sugar and 3 grams of fat). Protein shake (30 to 40 grams per day):  ? a.m. ? p.m. Exercise: ? 30 to 40 minutes per  Educated on Diet Progression    [facility] Gastric Bypass [facility] Slee[facility] Dietary Progression    Patient Name:   Date of Surgery: Ice Chips start once admitted on floor. Begin Bariatric Clear Liquid Diet on:     Clear Liquid Diet: 64 oz. of fluid per day  o Low calorie, low sugar, non-carbonated beverages  - Water, Crystal Light, Propel Water, Sugar Free Jell-O, Sugar Free Popsicles, Bouillon  - Start protein supplement during this stage. (60-70 grams per day)  - Getting your fluid in and staying hydrated is your #1 priority! - The clear liquid diet will last for 7 days. Begin Bariatric Soft and Moist on: 2/25  - This stage of the diet will last for 5 weeks, unless otherwise instructed by your surgeon.   - Begin:  1 week post-op   - End:  6 weeks post-op (or when you follow up with the Registered Dietitian)    - Soft, moist, high protein foods: 3 meals per day plus protein supplements. o   Portions should emphasize on soft protein. o Portions will be a MAXIMUM of:  o  1 ounce of solid food  o  2-3 ounces of cottage cheese and yogurt. o Protein supplements should be between meals and provide 30-40 grams per day during soft protein diet. o Continue to get 64 ounces of fluid in per day. - Protein foods that are ok on the Soft and Moist Diet include:  o Slow transition:  o 1st week on soft protein should focus on: Yogurt, cottage cheese, eggs  o 2nd -4th  week on soft protein diet should focus on: yogurt, cottage cheese, eggs, canned tuna, canned chicken, tilapia, fish (needs to be soft enough to be cut up with a fork)  o 5th week on soft protein diet should focus on: Yogurt, cottage cheese, eggs, canned tuna, canned chicken, tilapia, fish, salmon, chicken breast, or turkey. Remember to continue to get 64 ounces of fluid daily on ALL Stages. To be advanced to Bariatric Maintenance Stage of the bariatric diet, follow up with the dietitian 6 weeks post-op, around:         For any additional questions, please refer to your blue binder that was provided to you at the start of the bariatric program.

## 2020-02-18 NOTE — DIABETES MGMT
Glycemic Control Plan of Care    Sitting up in recliner post op day 1 s/p laparoscopic gastric bypass surgery - tolerating clear liquid gastric bypass diet - no n/v.  Reviewed with patient her A1c of 8.2 in November 2019. Repeat A1c today -      Lab Results   Component Value Date/Time    Hemoglobin A1c 7.7 (H) 02/18/2020 03:45 AM   .      States she was told she has pre-diabetes last year - educated about A1c, T2DM and BG targets. Her PCP ordered [place] glucom[place] [facility] [place] Educated about when to monitor BG and record for her PCP. Discussed metformin and she is in agreement with metformin 500 mg BID and f/u with PCP for a repeat A1c.         Brayden Malone MPH RN  Pager [phone]  Office [phone] I agree with the Gabe Saeed

## 2020-02-18 NOTE — ROUTINE PROCESS
Bedside and Verbal shift change report given to Cornelia Johnson RN (oncoming nurse) by Rachel Vasquez RN, BSN (offgoing nurse). Report included the following information SBAR, Kardex, OR Summary, Procedure Summary, Intake/Output, MAR, Recent Results and Cardiac Rhythm NSR with parameters.      Rachel Vasquez RN, BSN

## 2020-02-18 NOTE — ROUTINE PROCESS
Patient stated that she takes for percocet 7.5/325 mg a day. No noted order. Attempted to ambulate patient three times prior to midnight. Patient falls asleep in the middle of her conversations. Given acetaminophen 650 mg by mouth and Toradol 15 mg intravenously at 0038. Patient is still taking acetaminophen at this time. Attempted to ambulate patient at 80. Patient decided to call her . Informed patient to use her call bell to inform this nurse when she is ready to ambulate. No requests at this time.       Samir Collins, RN, BSN

## 2020-02-18 NOTE — OP NOTES
[facility] DIVISION  OPERATIVE REPORT    Name:  Mere Caal  MR#:   [id number]  :  1975  ACCOUNT #:  [de-identified]  DATE OF SERVICE:  2020    PREOPERATIVE DIAGNOSES:  1. 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 2.  Chronic calculous cholecystitis. 3.  Hepatic steatosis. POSTOPERATIVE DIAGNOSES:  1. Super obesity with the body mass index of 53 and obesity-related comorbidities of hypertension, prediabetes, hypercholesterolemia, CPAP-dependent obstructive sleep apnea, and weight-related arthropathy of her back and hips. 2.  Chronic calculous cholecystitis. 3.  Hepatic steatosis. PROCEDURES PERFORMED:  1. Laparoscopic cholecystectomy with intraoperative cholangiography. 2.  Laparoscopic Deyanira-en-Y gastric bypass utilizing a 15 mL separate tubularized gastric pouch placed on the lesser curvature of the stomach, a 570 cm retrocolic retrogastric Deyanira limb. 3.  Laparoscopic left hepatic lobe wedge biopsy. SURGEON:  Leela Farias. Emili Ardon, DO Quita Diaz, SA; [place], SA    ANESTHESIA:  General endotracheal supplemented at the conclusion of the procedure with local infiltration of the operative sites with an equal mixture of 1% lidocaine plain mixed in equal proportion with 0.5% Marcaine diluted with 1:200,000 epinephrine, utilizing a total volume of 50 mL. COMPLICATIONS:  None. SPECIMENS REMOVED:  1.  Gallbladder and contents. 2.  Left hepatic lobe wedge biopsy. IMPLANTS:  None. ESTIMATED BLOOD LOSS:  25 mL. OPERATIVE FINDINGS:  The patient had marked hepatomegaly with a morphologic appearance of hepatic steatosis.   She had omental adhesions in the inferior surface of the gallbladder and multiple gallstones contained within the gallbladder consistent with chronic calculous cholecystis, and had a normal intraoperative cholangiogram.    INDICATIONS FOR THE SURGICAL PROCEDURE:  This is a 57-year-old white female who has failed medical management of her super obesity and after investigation of the surgical options, has elected to pursue laparoscopic, potentially open Deyanira-en-Y gastric bypass to achieve definitive durable weight loss on a personal level with expected resolution of her obesity-related comorbidities. The patient in addition has been consented for laparoscopic possible open cholecystectomy with intraoperative cholangiography secondary to symptomatic cholelithiasis documented on ultrasound. Additionally, the patient had been consented for laparoscopic possible open left hepatic lobe wedge biopsy to definitively histologically characterize the preoperative ultrasonographic findings of fatty infiltration of the liver. The risks, benefits of operative versus nonoperative potential alternatives and potential complications up to and including death were extensively discussed with the patient prior to the surgical procedure, who voiced her understanding and wished to proceed. DESCRIPTION OF PROCEDURE:  The patient received Cleocin secondary to a PENICILLIN ALLERGY for antibiotic prophylaxis and Lovenox in the preoperative staging area. After signing her operative permit, which was properly witnessed, she was then transported to the operating room where she was placed in supine position on the operating room table and SCDs were placed and inflated prior to induction of general endotracheal anesthesia. A 16-Grenadian Mann catheter was then placed atraumatically to gravity drainage, as was a 34-Grenadian orogastric tube for gastric decompression. The abdomen was then prepped and draped in usual sterile fashion. A time-out procedure was performed according to protocol and agreed upon by all personnel in the operating suite.   A transverse 12 mm cutaneous incision was then made just superior to the umbilicus in the midline through which a 12 mm Optiview trocar and cannula were placed into the peritoneal cavity under direct vision utilizing a 10 mm 0-degree laparoscope. The laparoscope and trocar were removed. The abdomen was insufflated with carbon dioxide gas, never exceeding a pressure of 15 mmHg, and a 30-degree 10 mm laparoscope was placed and utilized for the remainder of the procedure. The remaining ports were placed under direct vision. The second port was placed in the left anterior axillary line 2 fingerbreadths below the left costal margin through a 5 mm cutaneous incision utilizing a 5 mm Optiview trocar and cannula. The third port was placed through a 12 mm incision in the midway between the left upper quadrant and the supraumbilical port utilizing a 12 mm Optiview trocar and cannula. The fourth in the right paramedian location midway between the costal margin and the umbilicus 8 cm from the midline utilizing 12 mm cutaneous incision and 12 mm Optiview trocar and cannula. A 12 mm cutaneous incision was then made one fingerbreadth distance from the xiphisternal junction to the umbilicus, through which a 12 mm Optiview trocar and cannula were placed into the peritoneal cavity and the last through a 5 mm cutaneous incisions 2 fingerbreadths below the right costal margin right anterior axillary line, placing a 5 mm Optiview trocar and cannula through a 5 mm cutaneous incision. Inspection of the upper abdomen noted that the patient had marked hepatomegaly with morphologic appearance of hepatic steatosis. The gallbladder was noted to be covered on its inferior aspect with omental adhesions, which were taken down after grasping the fundus and retracting it superiorly. The liver was so heavy and large, however, exposure was very difficult secondary to the inability to retract the liver adequately.   After taking the omental adhesions down, the infundibulum was grasped and retracted in the inferolateral direction. The peritoneum overlying the cystic duct was incised and carried both medially and laterally to the junction of the gallbladder with the liver. Further dissection in the triangle of Calot allowed clear delineation of both the cystic duct as well as the cystic artery giving a critical view. The cystic artery was doubly clipped proximally. The cystic duct was clipped proximally, and a cystic ductotomy was performed. A 14-Congolese angiocatheter was introduced into the peritoneal cavity through which a 4-Congolese ureteral catheter was introduced into the peritoneal cavity and subsequently the cystic ductotomy, where it was held in position utilizing a retaining clip. Intraoperative cholangiography was then pursued utilizing full-strength Hypaque 60, a total of 13 mL was utilized for the evaluation. The intraoperative cholangiogram demonstrated the cystic duct, cystic duct junction with the common hepatic duct, left and right hepatic ducts, common hepatic duct, and common bile duct without evidence of filling defect, and there was free spillage of contrast into the duodenum. The retaining clip, the ureteral catheter, and angiocatheter were removed. The cystic duct was doubly  clipped proximally and transected. The previously clipped cystic artery was transected, and the gallbladder was dissected from the hepatic fossa and it was noted to be intrahepatic, and then placed an Endopouch, removed through the epigastric 12 mm trocar defect. The epigastric trocar defect was then closed with a suture passing device and #2 Vicryl suture. The omentum and transverse colon were reflected superiorly. 40 cm distal to the ligament of Treitz, the jejunum was transected with a triple-load stapling device 60 mm in length loaded with 2.5 mm staples. The mesentery was then divided down to its origin in the retroperitoneum utilizing the Harmonic scalpel.   The Deyanira limb was measured to be 150 cm in length, and at this point, on its antimesenteric border, an enterotomy was created with a Harmonic scalpel. A similar antimesenteric enterotomy was created 2 cm proximal to the staple line of the biliopancreatic limb, and a stapled side-to-side functional end-to-side jejunojejunostomy was constructed utilizing a triple-load stapling device 60 mm in length loaded with 2.5 mm staples, introducing one arm of the stapling device into each of the respective limbs prior to firing it on the antimesenteric borders. The remaining enteric defect was then closed with suture passing device and #2 Vicryl suture, and the mesenteric defect was closed with a running 2-0 silk suture. The ligament of Treitz was re-identified. Just anterior to the ligament of Treitz, a fenestration was created through the mesocolon into the lesser sac utilizing the harmonic scalpel, through which the Deyanira limb was placed into the lesser sac. The omentum and transverse colon were reflected back to their normal anatomic positions. The odilia was identified along the lesser curvature of the stomach, and the omentum was then  from the greater curvature utilizing a harmonic scalpel until the Deyanira limb was visualized within the lesser sac. A 10 mm atraumatic grasping forceps was placed through the epigastric port site utilizing in conjunction with the self-retaining retractor to elevate the left lobe of the liver and provide hiatal exposure. The peritoneum overlying the angle of His was then opened with the Harmonic scalpel, 5 cm distal to the GE junction and along the lesser curvature of the stomach. The neurovascular elements of the lesser omentum were  from the gastric wall utilizing the Harmonic scalpel. Completion of this lesser curve fenestration into the lesser sac was accomplished with an esophageal retractor introduced posterior to the stomach. The 34-Swedish orogastric tube was then retracted into the esophagus.   A triple-load stapling device 60 mm in length loaded with 2.5 mm staples was introduced into the lesser curve fenestration, oriented perpendicular to the lesser curve 5 cm distal to the GE junction prior to firing one-half the length of its staple load. The 34-Sami orogastric tube was then advanced and utilized as a sizing template for construction of the tubularized gastric pouch placed on the lesser curvature of the stomach. The vertical aspect of the pouch was initiated with a triple-load stapling device 60 mm in length loaded with 3.5 mm staples utilizing one-half length of the staple load. The remainder of the pouch was constructed with sequential firings of a triple-load stapling device 60 mm in length loaded with 2.5 mm staples, ending the transection at the angle of His. It should be noted that the initial full-length stapling device utilized a Seamguard buttressing strip and this created a 15 mL separate tubularized gastric pouch, placed on the lesser curvature of the stomach. The Deyanira limb was elevated posterior to the stomach in a retrogastric position, where a tension-free hand-sewn tubularized gastrojejunostomy was constructed. The geometric orientation of the gastrojejunostomy was at the distal aspect of the tubularized gastric pouch and the antimesenteric border of the Deyanira limb 2 cm distal to the staple line. The posterior Deyanira limb, a seromuscular 3-0 Vicryl suture was placed. The harmonic scalpel was then utilized to create a 12 mm transverse gastrotomy at the distal aspect of the tubularized gastric pouch with an adjacent antimesenteric 12 mm Deyanira limb enterotomy. A running inner layer of full-thickness 3-0 Vicryl suture was then  initiated in the left lateral posterior aspect of the anastomosis running across the posterior aspect of the anastomosis around the right lateral aspect of the anastomosis to the anterior midline.   A second full-thickness 3-0 Vicryl suture was initiated adjacent to the origin of the first and running around the left lateral aspect of the anastomosis to the anterior midline. The 34-Arabic orogastric tube was then advanced across the gastrojejunal anastomosis into the Deyanira limb prior to tying the running inner layer of full-thickness 3-0 Vicryl sutures together anteriorly. The gastrojejunal anastomosis was then completed anteriorly utilizing a running 3-0 seromuscular Vicryl suture. The 34-Arabic orogastric tube was then removed, and the ileum and transverse colon were reflected superiorly. The space which appeared concerning about the curve was closed with a running 2-0 silk suture. The mesocolic defect was closed with a series of simple interrupted 2-0 silk sutures. The omentum and transverse colon were then reflected back to their normal anatomic position. The Deyanira limb was noted to be viable with adequate redundancy above the level of the mesocolon and there was no tension noted at the gastrojejunal anastomosis. The subcutaneous retractor and atraumatic grasping forceps were then removed. The free edge of the left lobe of the liver was retracted in such fashion so that a 1.5 cm wedge-shaped biopsy could be obtained sharply. This was passed off the field for definitive histologic characterization and hemostasis was established with the electrocautery. The laparoscope was then shifted to the right mid abdominal port to allow closure of both the supraumbilical fascial trocar defect as well as the left midabdominal fascial trocar defect with the suture passing device and #2 Vicryl suture. All operative sites were inspected and noted to be hemostatic. The abdomen was then desufflated of carbon dioxide gas. The previously placed fascial sutures were tied. The wounds were irrigated with normal saline solution. Closure of the skin was accomplished with a series of simple interrupted buried deep dermal 4-0 Monocryl sutures.   The incisions were then infiltrated with an equal mixture of 1% lidocaine plain mixed in equal proportions with 0.5% Marcaine diluted in 1:200,000 epinephrine, utilizing a total volume of 50 mL. Steri-Strips were applied, as was a sterile dressing. Sponge and needle counts were correct both during and at the completion of the procedure. The patient tolerated the procedure without operative complications, and subsequently was extubated and transported to the recovery room awake, alert, and in stable condition. The estimated blood loss was 25 mL. The patient received 1500 mL of crystalloid during the procedure, had an urine output of 150 mL. The operative start time was 1437, completion time was 1735.       Teena Pulliam DO      [id number]  D:  02/17/2020 18:09  T:  02/18/2020 4:27  JOB #:  [id number]

## 2020-02-19 VITALS
DIASTOLIC BLOOD PRESSURE: 70 MMHG | BODY MASS INDEX: 50.02 KG/M2 | TEMPERATURE: 98.7 F | SYSTOLIC BLOOD PRESSURE: 110 MMHG | RESPIRATION RATE: 18 BRPM | WEIGHT: 293 LBS | HEART RATE: 83 BPM | OXYGEN SATURATION: 95 % | HEIGHT: 64 IN

## 2020-02-19 LAB — GLUCOSE BLD STRIP.AUTO-MCNC: 131 MG/DL (ref 70–110)

## 2020-02-19 PROCEDURE — 74011250637 HC RX REV CODE- 250/637: Performed by: NURSE PRACTITIONER

## 2020-02-19 PROCEDURE — 82962 GLUCOSE BLOOD TEST: CPT

## 2020-02-19 PROCEDURE — 74011250636 HC RX REV CODE- 250/636: Performed by: NURSE PRACTITIONER

## 2020-02-19 PROCEDURE — 74011250637 HC RX REV CODE- 250/637: Performed by: SURGERY

## 2020-02-19 PROCEDURE — 74011250636 HC RX REV CODE- 250/636: Performed by: SURGERY

## 2020-02-19 RX ADMIN — ENOXAPARIN SODIUM 40 MG: 40 INJECTION SUBCUTANEOUS at 09:23

## 2020-02-19 RX ADMIN — KETOROLAC TROMETHAMINE 15 MG: 30 INJECTION, SOLUTION INTRAMUSCULAR at 00:12

## 2020-02-19 RX ADMIN — OXYCODONE HYDROCHLORIDE AND ACETAMINOPHEN 1 TABLET: 7.5; 325 TABLET ORAL at 09:23

## 2020-02-19 RX ADMIN — DULOXETINE HYDROCHLORIDE 60 MG: 30 CAPSULE, DELAYED RELEASE ORAL at 09:24

## 2020-02-19 RX ADMIN — ACETAMINOPHEN 650 MG: 325 TABLET, FILM COATED ORAL at 07:50

## 2020-02-19 NOTE — ROUTINE PROCESS
Attempted to give patient Percocet 7.5/325 mg. Patient yelling at this nurse because nurse was not available to give her pain medication at 2100. Tried to educate patient that her PRN Percocet is not scheduled and and is to be given every four hour as needed. Patient insists on talking over this nurse. Refuses to take direction. Refuses to listen. Stated that she wants to go home and that she has her own medications at home. Refused to get her blood glucose checked. Demanded that her  take out her IV site. Requested nursing supervisor. Supervisor paged. MD paged and order given to allow patient to be discharged AMA.     Ancelmo Worrell, RN, BSN

## 2020-02-19 NOTE — ROUTINE PROCESS
Bedside and Verbal shift change report given to carolyn   (oncoming nurse) by Anali Hartman RN   (offgoing nurse).  Report included the following information SBAR, Kardex and Intake/Output. ]

## 2020-02-19 NOTE — ANESTHESIA POSTPROCEDURE EVALUATION
Procedure(s):  LAPAROSCOPIC Deyanira-En-Y GASTRIC BYPASS , cholecystectomy with IOC and Liver Biopsy. general    Anesthesia Post Evaluation      Multimodal analgesia: multimodal analgesia used between 6 hours prior to anesthesia start to PACU discharge  Patient location during evaluation: bedside  Patient participation: complete - patient participated  Level of consciousness: awake  Pain management: adequate  Airway patency: patent  Anesthetic complications: no  Cardiovascular status: stable  Respiratory status: acceptable  Hydration status: acceptable  Post anesthesia nausea and vomiting:  controlled      Vitals Value Taken Time   /75 2/17/2020  7:31 PM   Temp 37 °C (98.6 °F) 2/17/2020  5:57 PM   Pulse 99 2/17/2020  7:43 PM   Resp 17 2/17/2020  7:43 PM   SpO2 95 % 2/17/2020  7:43 PM   Vitals shown include unvalidated device data.

## 2020-02-19 NOTE — ROUTINE PROCESS
Bedside and Verbal shift change report given to Laura Luther RN (oncoming nurse) by Chad Purvis RN, BSN (offgoing nurse). Report included the following information SBAR, Kardex, OR Summary, Procedure Summary, Intake/Output, MAR, Recent Results and Cardiac Rhythm NSR.      Chad Purvis RN, BSN

## 2020-02-19 NOTE — PROGRESS NOTES
Problem: Discharge Planning  Goal: *Discharge to safe environment  Outcome: Resolved/Met   Plan home     Care Management Interventions  PCP Verified by CM: Yes  Palliative Care Criteria Met (RRAT>21 & CHF Dx)?: No  Mode of Transport at Discharge:  Other (see comment)  Transition of Care Consult (CM Consult): Discharge Planning  Discharge Durable Medical Equipment: No  Physical Therapy Consult: No  Occupational Therapy Consult: No  Speech Therapy Consult: No  Current Support Network: Lives with Spouse  Confirm Follow Up Transport: Family  Discharge Location  Discharge Placement: Home

## 2020-02-19 NOTE — ROUTINE PROCESS
Reviewed AMA process with patient. Patient stated that she was not going to leave AMA. Patient took Percocet and Pravachol as ordered. Stated that she just wanted staff to do their jobs so that she can leave at 0730.      0000  Administered Toradol as ordered. Unable to administer acetaminophen because patient was at 3,900 mg taken. Nursing supervisor in room while administering Toradol.     Liliana Powell, RN, BSN

## 2020-02-19 NOTE — DISCHARGE INSTRUCTIONS
Hydration  Hydration is your NUMBER ONE priority. Dehydration is the most common reason for readmission to the hospital. Dehydration occurs when  your body does not get enough fluid to keep it functioning at its best. Your body also requires fluid  to burn its stored fat calories for energy. Carry a bottle of water with you all day, even when you are away from home; remind yourself to  drink even if you dont feel thirsty. Drinking 64 ounces of fluid is your daily goal. You can tell if  youre getting enough fluid if youre making clear, light-colored urine five to 10 times per day. Signs of dehydration can be thirst, headache, hard stools or dizziness upon sitting or standing up. You should contact your surgeons office if you are unable to drink enough fluid to stay hydrated. --   [address] after Surgery   No lifting over 15 pounds for four weeks.  No driving while taking the pain medication (about seven to 10 days).  No tub baths, swimming or hot tubs until incisions are healed (about two weeks).  You may shower. Clean incisions daily /gently with soap and check incisions for signs of infection:  -- Redness around incision. -- Swelling at site. -- Drainage with an foul odor (pus). -- Increase tenderness around incision.  Take your temperature and resting pulse in the morning and evening. Record on tracking form  given to you. Call if your temperature is greater than 101 or your pulse rate is greater than 115.  Please contact your surgeon if you are having excessive abdominal pain (that lasts longer than  four hours and does not improve with prescribed pain medication), vomiting or shortness of breath.  Get up and move -- do not sit in one place for more than an hour.  You need to WALK (EXERCISE) for 30 minutes per day. -- Walking around your house does not count. -- Bike, treadmill and elliptical are OK. -- NO weight lifting or sit ups.    If constipated take an adult dose of Miralax (available over the counter). Contact the doctors  office if Miralax doesnt help.  You may swallow pills starting the day after surgery as long as they fit inside this [place]:   Continue to use your incentive spirometer (breathing machine) for the next couple of weeks to  help prevent pneumonia. [address]  Temperature/Heart Rate Log  Take your temperature and heart rate (pulse) twice a day for 14 days. Take both in the morning and  evening at about the same time each day (when you wake up and before you go to bed when you  are relaxed). Please contact your doctors office if your:   Temperature is higher than 101 degrees.  Heart rate (pulse) is higher than 115 beats per minute  (normal heart rate is 60 to 100 beats per minute). How to Take Your Heart Rate (Pulse)   Turn your left hand so that your palm is face-up.  With the index and middle fingers of your right  hand, draw a line from the base of your thumb to  just below the crease in your wrist. Your fingers  should nestle just to the left of the large tendon that  pops up when you bend your wrist toward you.  Dont press too hard, that will make the pulse go  away. Use gentle pressure.  Wait. It can take several seconds -- and several micro-adjustments in the placement of your two  fingers on your wrist -- to find your pulse. Just keep moving your fingers down or up your wrist  in small increments (and pausing for a few seconds) until you find it. How to Take Your Pulse Rate   Find a watch with a second hand and place it on your right wrist or on the table next  to your left hand.  After finding your pulse, count the number of beats for 20 seconds.  Multiply by three to get your heart rate, or beats per minute  (or just count for 60 seconds for a math-free option).  Normal, resting heart rate is about 60 to 100 beats per minute.   -- 46 --  PATIENT GUIDE TO BARIATRIC [address] [place] [address] Rd  Lovenox Self Injection Guide  Prepare  Step 1: Wash and dry your hands thoroughly. Step 2: Sit or lie in a comfortable position and choose an area  on the right or left side of the abdomen at least two inches away  from the belly button. Step 3: Clean the injection site with an alcohol swab and let dry. Inject  Step 4: Remove the needle cap by pulling it straight off the syringe and  discard it in a sharps . Step 5: With your other hand, pinch an inch of the cleansed area to  make a fold in the skin. Insert the full length of the needle straight  down -- at a 90? angle -- into the fold of skin. -- 50 --  PATIENT GUIDE TO BARIATRIC [address] [place] [address] Rd  Step 6: Press the plunger with your thumb until the syringe is empty. Then pull the needle straight out and release the skin fold. Dispose  Step 7: Point the needle down and away from yourself and others,  and then push down on the plunger to activate the safety shield. Step 8: Place the used syringe in the sharps . Do NOT expel the air bubble from the syringe before the injection. Administration should be alternated between the left and right abdominal wall. The whole length  of the needle should be introduced into a skin fold held between the thumb and forefinger; the  skin fold should be held throughout the injection. To minimize bruising, do not rub the injection  site after completion of the injection. Questions about LOVENOX? Call [phone]  -- 49 --  PATIENT GUIDE TO [address] [place] [address]  9. DIET AND LIFESTYLE  Key Diet Principles Following Bariatric Surgery   Begin each meal with soft moist high protein foods (i.e. chicken, turkey, yogurt, tuna, eggs,  cottage cheese, other fish and seafood).  Consume a minimum of 64 ounces of fluid each day to prevent dehydration. No straws.  No food and fluid together.  Stop drinking 30 minutes before a meal. You may begin fluids again  30 minutes after you finish a meal.   Eat very slowly and chew all foods completely.  Keep portions small.  No simple sugars or high fat foods. No carbonated beverages. No caffeine.  Eat three meals per day. No skipping. Avoid snacking between meals.  No alcohol. No smoking.  Two Flintstones Complete Chewable vitamins each day. Take one in the morning and one at night.  1,500 milligrams Calcium Citrate per day in separate dosages.  Vitamin D 3: 5,000 IU taken per day.  Vitamin B-12: Take 1,000 micrograms sublingually daily.  Iron: 60 milligrams per day from [facility].  Protein supplements of your choice. Must be low sugar (0 to 3 grams), low fat (0 to 3 grams) and  provide at least 35 to 40 grams of protein each day. You need 60 to 70 grams of protein (food  and supplements) each day.  Minimum of 30 minutes of physical activity daily. Do not [place] NSAIDS . Do not take Steroids without your surgeons permission. -- 48 --  [facility]  Your Priorities After Surgery  ? Fluid: 64 ounces of fluid per day. ? Protein: 60 to 70 grams of protein per day. ? Walk every day. Clear Liquid Diet  One week of clear liquids: minimum of 64 ounces of fluid per day. Fluid:   Zero calorie liquids.  No caffeine.  No carbonation.  No sugary drinks.  No alcohol.  No straws. Food   Protein drinks.  Less than 3 grams of sugar and  3 grams of fat per serving.  Protein drink should provide you with  60 to 70 grams of protein. Soft Protein Diet  Five weeks of soft protein (1 ounce for soft protein, 3 ounces of yogurt/cottage cheese). Three meals per day and 1 protein shake. Protein shakes should provide you with 30 grams of  protein on the soft protein diet.   Slow transition:   First week on soft protein diet -- focus on yogurt, cottage cheese, eggs, vegetarian refried beans,  black beans, kidney beans and white beans. (NO BAKED BEANS.)   Second through fourth week on soft protein diet -- focus on yogurt, cottage cheese, eggs,  canned tuna, canned chicken, tilapia and fish (needs to be soft enough to be cut up with a fork).  Fifth week on soft protein diet -- focus on yogurt, cottage cheese, eggs, canned tuna, canned  chicken, tilapia, fish, salmon, chicken breast or turkey. Fluid is your #1 Priority! Continue clear liquids between meals. You will need 64 ounces of fluid per day. Fluids that you can have include:   Water.  Zero calorie liquids. You will need to sip throughout the day and should therefore have a water bottle with you at all  times! No liquids with your meals. Stop 30 minutes before a meal and wait 30 minutes after a meal.  No straws. Zero calorie liquids. No caffeine. No carbonation. No sugary drinks. No alcohol. -- 46 --  60 [address] [facility]  Protein  You will need 60 to 70 grams of protein per day.  60 to 70 grams of protein shakes when on the clear liquid diet (two to three shakes per day).  30 to 50 grams of protein shakes when on the soft protein diet (one shake per day). Eat Three Times Per Day  You will need to eat three times per day. My planned times are:  _________________________________________________________  _________________________________________________________  _________________________________________________________  Nausea, Vomiting, Stomach Pain  If you have problems with nausea, vomiting or stomach pain, try:   Eating slowly: 20 to 30 minutes per meal.   Chewing food thoroughly: 20 to 30 chews before food is swallowed.  Small portions: measure portions in medicine cup.  Stopping before feeling full.  AVOIDING SUGAR and FRIED FOOD: sugar will cause dumping syndrome and lead to weight gain.   Exercise  I will need to get a minimum of 30 minutes of exercise per day or 150 minutes of exercise per week.  Walking, swimming, biking or elliptical.   Find something you enjoy! Vitamins  After surgery, you will need to take the following vitamins for the rest of your life -- FOREVER.  Vitamin D 3: 5,000 IU per day.  Calcium Citrate: 1,500 milligrams, taken separately.  Flintstones Complete: two per day, taken separately.  Sublingual Vitamin B-12: 1,000 micrograms daily.  Iron for menstruating women or patients with a history of low iron: 65 milligrams daily. We recommend going to [email or web address] com and purchasing iron from there. The lemon-lime has 60 milligrams. This iron is better absorbed than over-the-counter iron. -- 46 --  PATIENT GUIDE TO [address]  Clear Liquid Log  Getting your fluid in is top priority during this week. Fluids (MINIUM of 64 ounces per day):  ? 8 oz. ? 8 oz. ? 8 oz. ? 8 oz. ? 8 oz. ? 8 oz. ? 8 oz. ? 8 oz. ? 8 oz. ? 8 oz. ? 8 oz. ? 8 oz. Flintstones Complete Chewable: ? a.m. ? p.m. Calcium Citrate (1,500 milligrams/day):  Pill form  ? Two crushed pills (morning) ? Two crushed pills (afternoon) ? Two crushed pills (evening)  OR Upcal D (powder)  ? One pack/scoop ? One pack/scoop ? One pack/scoop  OR Celebrate Chewable Vitamins (500 mg each) or Bariat[facility] Advantage Chewables (500 mg)  ? One chewable (morning) ? One chewable (afternoon) ? One chewable (evening)  OR Liquid Calcium Citrate  ? 1 tbsp. Calcium Citrate ? 1 tbsp. Calcium Citrate ? 1 tbsp. Calcium Citrate  Vitamin D3: ? 5,000 IU daily. Vitamin B-12: ? 1,000 micrograms per day. Iron (menstruating women or patients with a history of low iron):  ? 60 milligrams per day from [facility] [employer]. Protein drinks (protein drinks should be under 3 grams of sugar and 3 grams of fat). Protein shake (60 grams per day): ? a.m. ? p.m. Exercise: ? 30 to 40 minutes per day.   -- 48 --  PATIENT GUIDE TO  [facility]  Bariatric Soft and Moist Diet Shopping List   alcium Citrate (1,500 milligrams/day):  Pill form  ? Two crushed pills (morning) ? Two crushed pills (afternoon) ? Two crushed pills (evening)  OR Upcal D (powder)  ? One pack/scoop ? One pack/scoop ? One pack/scoop  OR Celebrate Chewable Vitamins (500 mg each) or Bariatric Advantage Chewables (500 mg)  ? One chewable (morning) ? One chewable (afternoon) ? One chewable (evening)  OR Liquid Calcium Citrate  ? 1 tbsp. Calcium Citrate ? 1 tbsp. Calcium Citrate ? 1 tbsp. Calcium Citrate  Vitamin D3: ? 5,000 IU daily  Vitamin B-12: ? 1,000 micrograms per day  Iron (menstruating women or  patients with a history of low iron):  ? 60 milligrams per day  from [facility] [employer]  Protein drinks (protein drinks should be under  3 grams of sugar and 3 grams of fat). Protein shake (30 to 40 grams per day):  ? a.m. ? p.m. Exercise: ? 30 to 40 minutes per  Educated on Diet Progression    [facility] Gastric Bypass and Sleeve Dietary Progression    Patient Name:   Date of Surgery: Ice Chips start once admitted on floor. Begin Bariatric Clear Liquid Diet on:     Clear Liquid Diet: 64 oz. of fluid per day  o Low calorie, low sugar, non-carbonated beverages  - Water, Crystal Light, Propel Water, Sugar Free Jell-O, Sugar Free Popsicles, Bouillon  - Start protein supplement during this stage. (60-70 grams per day)  - Getting your fluid in and staying hydrated is your #1 priority! - The clear liquid diet will last for 7 days. Begin Bariatric Soft and Moist on:   - This stage of the diet will last for 5 weeks, unless otherwise instructed by your surgeon. - Begin:  1 week post-op   - End:  6 weeks post-op (or when you follow up with the Registered Dietitian)    - Soft, moist, high protein foods: 3 meals per day plus protein supplements. o   Portions should emphasize on soft protein.   o Portions will be a MAXIMUM of:  o  1 ounce of solid food  o  2-3 ounces of cottage cheese and yogurt. o Protein supplements should be between meals and provide 30-40 grams per day during soft protein diet. o Continue to get 64 ounces of fluid in per day. - Protein foods that are ok on the Soft and Moist Diet include:  o Slow transition:  o 1st week on soft protein should focus on: Yogurt, cottage cheese, eggs  o 2nd -4th  week on soft protein diet should focus on: yogurt, cottage cheese, eggs, canned tuna, canned chicken, tilapia, fish (needs to be soft enough to be cut up with a fork)  o 5th week on soft protein diet should focus on: Yogurt, cottage cheese, eggs, canned tuna, canned chicken, tilapia, fish, salmon, chicken breast, or turkey. Remember to continue to get 64 ounces of fluid daily on ALL Stages. To be advanced to Bariatric Maintenance Stage of the bariatric diet, follow up with the dietitian 6 weeks post-op, around: For any additional questions, please refer to your blue binder that was provided to you at the start of the bariatric program. Hydration  Hydration is your NUMBER ONE priority. Dehydration is the most common reason for readmission to the hospital. Dehydration occurs when  your body does not get enough fluid to keep it functioning at its best. Your body also requires fluid  to burn its stored fat calories for energy. Carry a bottle of water with you all day, even when you are away from home; remind yourself to  drink even if you dont feel thirsty. Drinking 64 ounces of fluid is your daily goal. You can tell if  youre getting enough fluid if youre making clear, light-colored urine five to 10 times per day. Signs of dehydration can be thirst, headache, hard stools or dizziness upon sitting or standing up. You should contact your surgeons office if you are unable to drink enough fluid to stay hydrated. --   [address] after Surgery   No lifting over 15 pounds for four weeks.    No driving while taking the pain medication (about seven to 10 days).  No tub baths, swimming or hot tubs until incisions are healed (about two weeks).  You may shower. Clean incisions daily /gently with soap and check incisions for signs of infection:  -- Redness around incision. -- Swelling at site. -- Drainage with an foul odor (pus). -- Increase tenderness around incision.  Take your temperature and resting pulse in the morning and evening. Record on tracking form  given to you. Call if your temperature is greater than 101 or your pulse rate is greater than 115.  Please contact your surgeon if you are having excessive abdominal pain (that lasts longer than  four hours and does not improve with prescribed pain medication), vomiting or shortness of breath.  Get up and move -- do not sit in one place for more than an hour.  You need to WALK (EXERCISE) for 30 minutes per day. -- Walking around your house does not count. -- Bike, treadmill and elliptical are OK. -- NO weight lifting or sit ups.  If constipated take an adult dose of Miralax (available over the counter). Contact the doctors  office if Miralax doesnt help.  You may swallow pills starting the day after surgery as long as they fit inside this [place]:   Continue to use your incentive spirometer (breathing machine) for the next couple of weeks to  help prevent pneumonia. [address]  Temperature/Heart Rate Log  Take your temperature and heart rate (pulse) twice a day for 14 days. Take both in the morning and  evening at about the same time each day (when you wake up and before you go to bed when you  are relaxed). Please contact your doctors office if your:   Temperature is higher than 101 degrees.  Heart rate (pulse) is higher than 115 beats per minute  (normal heart rate is 60 to 100 beats per minute). How to Take Your Heart Rate (Pulse)   Turn your left hand so that your palm is face-up.    With the index and middle fingers of your right  hand, draw a line from the base of your thumb to  just below the crease in your wrist. Your fingers  should nestle just to the left of the large tendon that  pops up when you bend your wrist toward you.  Dont press too hard, that will make the pulse go  away. Use gentle pressure.  Wait. It can take several seconds -- and several micro-adjustments in the placement of your two  fingers on your wrist -- to find your pulse. Just keep moving your fingers down or up your wrist  in small increments (and pausing for a few seconds) until you find it. How to Take Your Pulse Rate   Find a watch with a second hand and place it on your right wrist or on the table next  to your left hand.  After finding your pulse, count the number of beats for 20 seconds.  Multiply by three to get your heart rate, or beats per minute  (or just count for 60 seconds for a math-free option).  Normal, resting heart rate is about 60 to 100 beats per minute. -- 55 --  PATIENT GUIDE TO BARIATRIC [address]  Lovenox Self Injection Guide  Prepare  Step 1: Wash and dry your hands thoroughly. Step 2: Sit or lie in a comfortable position and choose an area  on the right or left side of the abdomen at least two inches away  from the belly button. Step 3: Clean the injection site with an alcohol swab and let dry. Inject  Step 4: Remove the needle cap by pulling it straight off the syringe and  discard it in a sharps . Step 5: With your other hand, pinch an inch of the cleansed area to  make a fold in the skin. Insert the full length of the needle straight  down -- at a 90? angle -- into the fold of skin. -- 50 --  PATIENT GUIDE TO BARIATRIC [address]  Step 6: Press the plunger with your thumb until the syringe is empty. Then pull the needle straight out and release the skin fold.   Dispose  Step 7: Point the needle down and away from yourself and others,  and then push down on the plunger to activate the safety shield. Step 8: Place the used syringe in the sharps . Do NOT expel the air bubble from the syringe before the injection. Administration should be alternated between the left and right abdominal wall. The whole length  of the needle should be introduced into a skin fold held between the thumb and forefinger; the  skin fold should be held throughout the injection. To minimize bruising, do not rub the injection  site after completion of the injection. Questions about LOVENOX? Call [phone]  -- 49 --  PATIENT GUIDE TO [address] [place] [address]  9. DIET AND LIFESTYLE  Key Diet Principles Following Bariatric Surgery   Begin each meal with soft moist high protein foods (i.e. chicken, turkey, yogurt, tuna, eggs,  cottage cheese, other fish and seafood).  Consume a minimum of 64 ounces of fluid each day to prevent dehydration. No straws.  No food and fluid together. Stop drinking 30 minutes before a meal. You may begin fluids again  30 minutes after you finish a meal.   Eat very slowly and chew all foods completely.  Keep portions small.  No simple sugars or high fat foods. No carbonated beverages. No caffeine.  Eat three meals per day. No skipping. Avoid snacking between meals.  No alcohol. No smoking.  Two Flintstones Complete Chewable vitamins each day. Take one in the morning and one at night.  1,500 milligrams Calcium Citrate per day in separate dosages.  Vitamin D 3: 5,000 IU taken per day.  Vitamin B-12: Take 1,000 micrograms sublingually daily.  Iron: 60 milligrams per day from [facility] [employer].  Protein supplements of your choice. Must be low sugar (0 to 3 grams), low fat (0 to 3 grams) and  provide at least 35 to 40 grams of protein each day. You need 60 to 70 grams of protein (food  and supplements) each day.  Minimum of 30 minutes of physical activity daily.   Do not [place] NSAIDS . Do not take Steroids without your surgeons permission. -- 48 --  [address] [facility]  Your Priorities After Surgery  ? Fluid: 64 ounces of fluid per day. ? Protein: 60 to 70 grams of protein per day. ? Walk every day. Clear Liquid Diet  One week of clear liquids: minimum of 64 ounces of fluid per day. Fluid:   Zero calorie liquids.  No caffeine.  No carbonation.  No sugary drinks.  No alcohol.  No straws. Food   Protein drinks.  Less than 3 grams of sugar and  3 grams of fat per serving.  Protein drink should provide you with  60 to 70 grams of protein. Soft Protein Diet  Five weeks of soft protein (1 ounce for soft protein, 3 ounces of yogurt/cottage cheese). Three meals per day and 1 protein shake. Protein shakes should provide you with 30 grams of  protein on the soft protein diet. Slow transition:   First week on soft protein diet -- focus on yogurt, cottage cheese, eggs, vegetarian refried beans,  black beans, kidney beans and white beans. (NO BAKED BEANS.)   Second through fourth week on soft protein diet -- focus on yogurt, cottage cheese, eggs,  canned tuna, canned chicken, tilapia and fish (needs to be soft enough to be cut up with a fork).  Fifth week on soft protein diet -- focus on yogurt, cottage cheese, eggs, canned tuna, canned  chicken, tilapia, fish, salmon, chicken breast or turkey. Fluid is your #1 Priority! Continue clear liquids between meals. You will need 64 ounces of fluid per day. Fluids that you can have include:   Water.  Zero calorie liquids. You will need to sip throughout the day and should therefore have a water bottle with you at all  times! No liquids with your meals. Stop 30 minutes before a meal and wait 30 minutes after a meal.  No straws. Zero calorie liquids. No caffeine. No carbonation. No sugary drinks. No alcohol.   -- 46 --  PATIENT GUIDE TO [address] [place] [address] [address] will need 60 to 70 grams of protein per day.  60 to 70 grams of protein shakes when on the clear liquid diet (two to three shakes per day).  30 to 50 grams of protein shakes when on the soft protein diet (one shake per day). Eat Three Times Per Day  You will need to eat three times per day. My planned times are:  _________________________________________________________  _________________________________________________________  _________________________________________________________  Nausea, Vomiting, Stomach Pain  If you have problems with nausea, vomiting or stomach pain, try:   Eating slowly: 20 to 30 minutes per meal.   Chewing food thoroughly: 20 to 30 chews before food is swallowed.  Small portions: measure portions in medicine cup.  Stopping before feeling full.  AVOIDING SUGAR and FRIED FOOD: sugar will cause dumping syndrome and lead to weight gain. Exercise  I will need to get a minimum of 30 minutes of exercise per day or 150 minutes of exercise per week.  Walking, swimming, biking or elliptical.   Find something you enjoy! Vitamins  After surgery, you will need to take the following vitamins for the rest of your life -- FOREVER.  Vitamin D 3: 5,000 IU per day.  Calcium Citrate: 1,500 milligrams, taken separately.  Flintstones Complete: two per day, taken separately.  Sublingual Vitamin B-12: 1,000 micrograms daily.  Iron for menstruating women or patients with a history of low iron: 65 milligrams daily. We recommend going to [email or web address] [email or web address] com and purchasing iron from there. The lemon-lime has 60 milligrams. This iron is better absorbed than over-the-counter iron. -- 46 --  PATIENT GUIDE TO BARIATRIC [address]  Clear Liquid Log  Getting your fluid in is top priority during this week. Fluids (MINIUM of 64 ounces per day):  ? 8 oz. ? 8 oz. ? 8 oz. ? 8 oz. ? 8 oz. ? 8 oz. ? 8 oz. ? 8 oz. ? 8 oz. ? 8 oz. ? 8 oz. ? 8 oz. Flintstones Complete Chewable: ? a.m. ? p.m. Calcium Citrate (1,500 milligrams/day):  Pill form  ? Two crushed pills (morning) ? Two crushed pills (afternoon) ? Two crushed pills (evening)  OR Upcal D (powder)  ? One pack/scoop ? One pack/scoop ? One pack/scoop  OR Celebrate Chewable Vitamins (500 mg each) or Bariat[facility] Advantage Chewables (500 mg)  ? One chewable (morning) ? One chewable (afternoon) ? One chewable (evening)  OR Liquid Calcium Citrate  ? 1 tbsp. Calcium Citrate ? 1 tbsp. Calcium Citrate ? 1 tbsp. Calcium Citrate  Vitamin D3: ? 5,000 IU daily. Vitamin B-12: ? 1,000 micrograms per day. Iron (menstruating women or patients with a history of low iron):  ? 60 milligrams per day from [facility]. Protein drinks (protein drinks should be under 3 grams of sugar and 3 grams of fat). Protein shake (60 grams per day): ? a.m. ? p.m. Exercise: ? 30 to 40 minutes per day. -- 48 --  PATIENT GUIDE TO BARIATRIC [address] [place] [address] Rd  Bariatric Soft and Moist Diet Shopping List   alcium Citrate (1,500 milligrams/day):  Pill form  ? Two crushed pills (morning) ? Two crushed pills (afternoon) ? Two crushed pills (evening)  OR Upcal D (powder)  ? One pack/scoop ? One pack/scoop ? One pack/scoop  OR Celebrate Chewable Vitamins (500 mg each) or Bariatric Advantage Chewables (500 mg)  ? One chewable (morning) ? One chewable (afternoon) ? One chewable (evening)  OR Liquid Calcium Citrate  ? 1 tbsp. Calcium Citrate ? 1 tbsp. Calcium Citrate ? 1 tbsp. Calcium Citrate  Vitamin D3: ? 5,000 IU daily  Vitamin B-12: ? 1,000 micrograms per day  Iron (menstruating women or  patients with a history of low iron):  ? 60 milligrams per day  from [facility]  Protein drinks (protein drinks should be under  3 grams of sugar and 3 grams of fat). Protein shake (30 to 40 grams per day):  ? a.m. ? p.m.   Exercise: ? 30 to 40 minutes per  Educated on Diet Progression    [facility] Gastric Bypass and Sleeve Dietary Progression    Patient Name:   Date of Surgery: Ice Chips start once admitted on floor. Begin Bariatric Clear Liquid Diet on:     Clear Liquid Diet: 64 oz. of fluid per day  o Low calorie, low sugar, non-carbonated beverages  - Water, Crystal Light, Propel Water, Sugar Free Jell-O, Sugar Free Popsicles, Bouillon  - Start protein supplement during this stage. (60-70 grams per day)  - Getting your fluid in and staying hydrated is your #1 priority! - The clear liquid diet will last for 7 days. Begin Bariatric Soft and Moist on:   - This stage of the diet will last for 5 weeks, unless otherwise instructed by your surgeon. - Begin:  1 week post-op   - End:  6 weeks post-op (or when you follow up with the Registered Dietitian)    - Soft, moist, high protein foods: 3 meals per day plus protein supplements. o   Portions should emphasize on soft protein. o Portions will be a MAXIMUM of:  o  1 ounce of solid food  o  2-3 ounces of cottage cheese and yogurt. o Protein supplements should be between meals and provide 30-40 grams per day during soft protein diet. o Continue to get 64 ounces of fluid in per day. - Protein foods that are ok on the Soft and Moist Diet include:  o Slow transition:  o 1st week on soft protein should focus on: Yogurt, cottage cheese, eggs  o 2nd -4th  week on soft protein diet should focus on: yogurt, cottage cheese, eggs, canned tuna, canned chicken, tilapia, fish (needs to be soft enough to be cut up with a fork)  o 5th week on soft protein diet should focus on: Yogurt, cottage cheese, eggs, canned tuna, canned chicken, tilapia, fish, salmon, chicken breast, or turkey. Remember to continue to get 64 ounces of fluid daily on ALL Stages. To be advanced to Bariatric Maintenance Stage of the bariatric diet, follow up with the dietitian 6 weeks post-op, around:         For any additional questions, please refer to your blue binder that was provided to you at the start of the bariatric program.   Jemma Vallejo from Nurse  PATIENT INSTRUCTIONS:    After general anesthesia or intravenous sedation, for 24 hours or while taking prescription Narcotics:  · Limit your activities  · Do not drive and operate hazardous machinery  · Do not make important personal or business decisions  · Do  not drink alcoholic beverages  · If you have not urinated within 8 hours after discharge, please contact your surgeon on call. Report the following to your surgeon:  · Excessive pain, swelling, redness or odor of or around the surgical area  · Temperature over 100.5  · Nausea and vomiting lasting longer than 4 hours or if unable to take medications  · Any signs of decreased circulation or nerve impairment to extremity: change in color, persistent  numbness, tingling, coldness or increase pain  · Any questions    What to do at Home:  Recommended activity: Activity as tolerated,         *  Please give a list of your current medications to your Primary Care Provider. *  Please update this list whenever your medications are discontinued, doses are      changed, or new medications (including over-the-counter products) are added. *  Please carry medication information at all times in case of emergency situations. These are general instructions for a healthy lifestyle:    No smoking/ No tobacco products/ Avoid exposure to second hand smoke  Surgeon General's Warning:  Quitting smoking now greatly reduces serious risk to your health.     Obesity, smoking, and sedentary lifestyle greatly increases your risk for illness    A healthy diet, regular physical exercise & weight monitoring are important for maintaining a healthy lifestyle    You may be retaining fluid if you have a history of heart failure or if you experience any of the following symptoms:  Weight gain of 3 pounds or more overnight or 5 pounds in a week, increased swelling in our hands or feet or shortness of breath while lying flat in bed. Please call your doctor as soon as you notice any of these symptoms; do not wait until your next office visit. The discharge information has been reviewed with the patient. The patient verbalized understanding. Discharge medications reviewed with the patient and appropriate educational materials and side effects teaching were provided.   ___________________________________________________________________________________________________________________________________

## 2020-02-19 NOTE — PROGRESS NOTES
Problem: Falls - Risk of  Goal: *Absence of Falls  Description  Document Gissel Munson Fall Risk and appropriate interventions in the flowsheet.   Outcome: Progressing Towards Goal  Note: Fall Risk Interventions:  Mobility Interventions: Patient to call before getting OOB         Medication Interventions: Patient to call before getting OOB    Elimination Interventions: Call light in reach              Problem: Patient Education: Go to Patient Education Activity  Goal: Patient/Family Education  Outcome: Progressing Towards Goal     Problem: Pain  Goal: *Control of Pain  Outcome: Progressing Towards Goal     Problem: Laparoscopic Gastric Bypass:Day of Surgery  Goal: Off Pathway (Use only if patient is Off Pathway)  Outcome: Progressing Towards Goal  Goal: Activity/Safety  Outcome: Progressing Towards Goal  Goal: Consults, if ordered  Outcome: Progressing Towards Goal  Goal: Diagnostic Test/Procedures  Outcome: Progressing Towards Goal  Goal: Nutrition/Diet  Outcome: Progressing Towards Goal  Goal: Medications  Outcome: Progressing Towards Goal  Goal: Respiratory  Outcome: Progressing Towards Goal  Goal: Treatments/Interventions/Procedures  Outcome: Progressing Towards Goal  Goal: Psychosocial  Outcome: Progressing Towards Goal  Goal: *No signs and symptoms of infection or wound complications  Outcome: Progressing Towards Goal  Goal: *Optimal pain control at patient's stated goal  Outcome: Progressing Towards Goal  Goal: *Adequate urinary output (equal to or greater than 30 milliliters/hour)  Outcome: Progressing Towards Goal  Goal: *Hemodynamically stable  Outcome: Progressing Towards Goal  Goal: *Tolerating diet  Outcome: Progressing Towards Goal  Goal: *Demonstrates progressive activity  Outcome: Progressing Towards Goal  Goal: *Absence of signs and symptoms of DVT  Outcome: Progressing Towards Goal  Goal: *Labs within defined limits  Outcome: Progressing Towards Goal  Goal: *Oxygen saturation within defined limits  Outcome: Progressing Towards Goal     Problem: Laparoscopic Gastric Bypass:Post-Op Day 1  Goal: Off Pathway (Use only if patient is Off Pathway)  Outcome: Progressing Towards Goal  Goal: Activity/Safety  Outcome: Progressing Towards Goal  Goal: Diagnostic Test/Procedures  Outcome: Progressing Towards Goal  Goal: Nutrition/Diet  Outcome: Progressing Towards Goal  Goal: Discharge Planning  Outcome: Progressing Towards Goal  Goal: Medications  Outcome: Progressing Towards Goal  Goal: Respiratory  Outcome: Progressing Towards Goal  Goal: Treatments/Interventions/Procedures  Outcome: Progressing Towards Goal  Goal: Psychosocial  Outcome: Progressing Towards Goal  Goal: *No signs and symptoms of infection or wound complications  Outcome: Progressing Towards Goal  Goal: *Optimal pain control at patient's stated goal  Outcome: Progressing Towards Goal  Goal: *Adequate urinary output (equal to or greater than 30 milliliters/hour)  Outcome: Progressing Towards Goal  Goal: *Hemodynamically stable  Outcome: Progressing Towards Goal  Goal: *Tolerating diet  Outcome: Progressing Towards Goal  Goal: *Demonstrates progressive activity  Outcome: Progressing Towards Goal  Goal: *Absence of signs and symptoms of DVT  Outcome: Progressing Towards Goal  Goal: *Labs within defined limits  Outcome: Progressing Towards Goal  Goal: *Oxygen saturation within defined limits  Outcome: Progressing Towards Goal  Goal: *Upper GI series/No leak identified  Outcome: Progressing Towards Goal     Problem: Laparoscopic Gastric Bypass:Post-Op Day 2  Goal: Off Pathway (Use only if patient is Off Pathway)  Outcome: Progressing Towards Goal  Goal: Activity/Safety  Outcome: Progressing Towards Goal  Goal: Diagnostic Test/Procedures  Outcome: Progressing Towards Goal  Goal: Nutrition/Diet  Outcome: Progressing Towards Goal  Goal: Discharge Planning  Outcome: Progressing Towards Goal  Goal: Medications  Outcome: Progressing Towards Goal  Goal: Respiratory  Outcome: Progressing Towards Goal  Goal: Treatments/Interventions/Procedures  Outcome: Progressing Towards Goal  Goal: Psychosocial  Outcome: Progressing Towards Goal     Problem: Laparoscopic Gastric Bypass:Discharge Outcomes  Goal: *Active bowel sounds  Outcome: Progressing Towards Goal  Goal: *Absence of signs and symptoms of DVT  Outcome: Progressing Towards Goal  Goal: *Hemodynamically stable  Outcome: Progressing Towards Goal  Goal: *Lungs clear or at baseline  Outcome: Progressing Towards Goal  Goal: *Demonstrates independent activity or return to baseline  Outcome: Progressing Towards Goal  Goal: *Optimal pain control at patient's stated goal  Outcome: Progressing Towards Goal  Goal: *Verbalizes understanding and describes prescribed diet  Outcome: Progressing Towards Goal  Goal: *Tolerating diet  Outcome: Progressing Towards Goal  Goal: *Verbalizes name, dosage, time, side effects, and number of days to continue medications  Outcome: Progressing Towards Goal  Goal: *No signs and symptoms of infection or wound complications  Outcome: Progressing Towards Goal  Goal: *Anxiety reduced or absent  Outcome: Progressing Towards Goal  Goal: *Understands and describes signs and symptoms to report to providers (eg: s/s of leak, DVT; inability to tolerate diet)  Outcome: Progressing Towards Goal  Goal: *Describes follow-up/return visits to physicians  Outcome: Progressing Towards Goal  Goal: *Describes available resources and support systems  Outcome: Progressing Towards Goal     Problem: High Risk or History of JESSICA  Goal: Recognition of JESSICA or High Risk for JESSICA  Outcome: Progressing Towards Goal  Goal: Maintain Patent Airway and Adequate Oxygenation  Outcome: Progressing Towards Goal  Goal: Avoid Over-sedation  Outcome: Progressing Towards Goal  Goal: Maintenance Care of JESSICA  Outcome: Progressing Towards Goal     Problem: Diabetes Self-Management  Goal: *Disease process and treatment process  Description  Define diabetes and identify own type of diabetes; list 3 options for treating diabetes. Outcome: Progressing Towards Goal  Goal: *Incorporating nutritional management into lifestyle  Description  Describe effect of type, amount and timing of food on blood glucose; list 3 methods for planning meals. Outcome: Progressing Towards Goal  Goal: *Incorporating physical activity into lifestyle  Description  State effect of exercise on blood glucose levels. Outcome: Progressing Towards Goal  Goal: *Developing strategies to promote health/change behavior  Description  Define the ABC's of diabetes; identify appropriate screenings, schedule and personal plan for screenings. Outcome: Progressing Towards Goal  Goal: *Using medications safely  Description  State effect of diabetes medications on diabetes; name diabetes medication taking, action and side effects. Outcome: Progressing Towards Goal  Goal: *Monitoring blood glucose, interpreting and using results  Description  Identify recommended blood glucose targets  and personal targets. Outcome: Progressing Towards Goal  Goal: *Prevention, detection, treatment of acute complications  Description  List symptoms of hyper- and hypoglycemia; describe how to treat low blood sugar and actions for lowering  high blood glucose level. Outcome: Progressing Towards Goal  Goal: *Prevention, detection and treatment of chronic complications  Description  Define the natural course of diabetes and describe the relationship of blood glucose levels to long term complications of diabetes.   Outcome: Progressing Towards Goal  Goal: *Developing strategies to address psychosocial issues  Description  Describe feelings about living with diabetes; identify support needed and support network  Outcome: Progressing Towards Goal  Goal: *Insulin pump training  Outcome: Progressing Towards Goal  Goal: *Sick day guidelines  Outcome: Progressing Towards Goal  Goal: *Patient Specific Goal (EDIT GOAL, INSERT TEXT)  Outcome: Progressing Towards Goal     Problem: Patient Education: Go to Patient Education Activity  Goal: Patient/Family Education  Outcome: Progressing Towards Goal     Problem: Pressure Injury - Risk of  Goal: *Prevention of pressure injury  Description  Document Rigoberto Scale and appropriate interventions in the flowsheet. Outcome: Progressing Towards Goal  Note: Pressure Injury Interventions:             Activity Interventions: Increase time out of bed, Pressure redistribution bed/mattress(bed type)    Mobility Interventions: HOB 30 degrees or less, Pressure redistribution bed/mattress (bed type)    Nutrition Interventions: Document food/fluid/supplement intake, Offer support with meals,snacks and hydration                     Problem: Patient Education: Go to Patient Education Activity  Goal: Patient/Family Education  Outcome: Progressing Towards Goal

## 2020-02-19 NOTE — DISCHARGE SUMMARY
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 Discharge Diagnosis:     Clinically Severe Obesity, s/p laparoscopic gastric bypass surgery with comorbidities as listed above    Procedures:   laparoscopic gastric bypass surgery    Postop Complications: pain control POD1, now resolved     Hospital Course:  Patient was admitted on 2/17/2020 for scheduled bariatric surgery. Operation was without significant complication. Patient admitted to the floor postoperatively, monitored as per protocol. Diet sequentially advanced beginning POD 1, pain medications transitioned to oral during the hospital course. At the time of discharge, the patient is afebrile, vital signs stable, tolerating a clear liquid diet with protein supplementation, voiding spontaneously, ambulatory with adequate pain control with oral medications and clear surgical sites without evidence of infection.     Discharge Diet:  Clear Liquid Bariatric Diet for 7 days, then soft moist protein diet for 5 weeks    Discharge Medications:   *All medications as per Medical Reconciliation Form\"    Flintstones Complete Chewable vitamins, 2 orally daily for life  Calcium Citrate 2000mg orally daily for life  Vitamin B12 1000micrograms sublingual daily for life  Oxycodone 5mg tab 1-2 by mouth every 4-6 hours as needed for pain uncontrolled with Tylenol  Enoxaparin (Lovenox) 40mg sub-Q daily for 7 days    Discharge disposition: home    Check blood glucose levels for one week and report results to PCP    Local wound care with daily showers, keep wounds clean and dry    Activity: as desired, no lifting greater than 15lbs or situps for 30 days    Special Instructions:   No driving until activity is not influenced by incisional pain and off narcotics   No bath or hot tub until wounds are healed   Pulse and temperature twice daily for 10 days   Notify [facility] for a Temp >100.5 or Pulse>115    Followup with surgeon in 2 weeks      JAYLEN Sepulveda-BC  [facility]   Office: [phone]

## 2020-03-03 ENCOUNTER — OFFICE VISIT (OUTPATIENT)
Dept: SURGERY | Age: 45
End: 2020-03-03

## 2020-03-03 VITALS
HEART RATE: 92 BPM | OXYGEN SATURATION: 96 % | TEMPERATURE: 97.7 F | BODY MASS INDEX: 49.31 KG/M2 | WEIGHT: 288.8 LBS | HEIGHT: 64 IN | SYSTOLIC BLOOD PRESSURE: 131 MMHG | DIASTOLIC BLOOD PRESSURE: 81 MMHG

## 2020-03-03 DIAGNOSIS — K91.2 POSTOPERATIVE INTESTINAL MALABSORPTION: Primary | ICD-10-CM

## 2020-03-03 RX ORDER — CARISOPRODOL 350 MG/1
350 TABLET ORAL 4 TIMES DAILY
COMMUNITY

## 2020-03-03 RX ORDER — ACETAMINOPHEN 325 MG/1
TABLET ORAL
COMMUNITY

## 2020-03-03 NOTE — PROGRESS NOTES
Juan Miguel Hernandez is a 45 y.o. female (: 1975) presenting to address:    Chief Complaint   Patient presents with    Post OP Follow Up     LGBP/ Cholecystectomy done on 20       Medication list and allergies have been reviewed with Juan Miguel Hernandez and updated as of today's date. I have gone over all Medical, Surgical and Social History with Juan Miguel Hernandez and updated/added the information accordingly. 1. Have you been to the ER, Urgent Care or Hospitalized since your last visit? NO      2. Have you followed up with your PCP or any other Physicians since your procedure/ last office visit?    NO

## 2020-03-03 NOTE — PROGRESS NOTES
Subjective:      Kai Bowser is a 45 y.o. female white female status post lap scopic gastric bypass, cholecystectomy, liver biopsy February 17, 2020 who presents in follow-up noting expected decreasing incisional pain no longer requiring analgesics and otherwise without complaint. Tolerant of an compliant with an early postoperative bariatric surgical diet with appropriate early satiety and no specific food intolerances or pathologic emesis. The patient is not attempted high density carbohydrates and therefore has not experienced dumping syndrome. Compliant multivitamin, calcium citrate, vitamin B1, vitamin B12 and vitamin D supplementation. Exercise regimen: Walking 30 minutes daily. Comorbidities: Prediabetes, hypertension-resolved. CPAP dependent obstructive sleep apnea, weight related arthropathy-improved. Hypercholesterolemia-not evaluated  Preoperative weight: 308. Current weight: 289. BMI: 49. Estimated weight loss: 142. Current weight loss: 19.  Goal weight: 160. Percentage weight loss: 13% / 15 days        Weight Loss Metrics 3/3/2020 3/3/2020 2/17/2020 2/11/2020 1/31/2020 12/10/2019 12/10/2019   Pre op / Initial Wt 313 - - - - 313 -   Today's Wt - 288 lb 12.8 oz - 313 lb 308 lb 6.4 oz - 311 lb   BMI - 49.57 kg/m2 53.73 kg/m2 - 52.94 kg/m2 - 53.38 kg/m2   Ideal Body Wt 131 - - - - 131 -   Excess Body Wt 182 - - - - 182 -   Goal Wt 167 - - - - 167.4 -   Wt loss to date 24.2 - - - - 2 -   % Wt Loss 0.17 - - - - 0.01 -   80% .6 - - - - 145.6 -               Past Medical History:   Diagnosis Date    Anxiety     Blurred vision, bilateral     Burning with urination     Chest pain     Coughing up blood     one episode    Depression     Diabetes ([address]     Just diagnosed.   Not on any medicines    Diarrhea     Ear ache     Fibromyalgia     Headache     Heartburn     Hypercholesteremia     Hypertension     Joint pain     Muscle ache     Pelvic pain     Pre-diabetes     Recent change in weight     Sarcoidosis of skin     Sleep apnea     on bipap    Tiredness     Trouble in sleeping        Past Surgical History:   Procedure Laterality Date    AMB POC SKIN BIOPSY      HX TONSILLECTOMY         Current Outpatient Medications   Medication Sig Dispense Refill    carisoprodoL (SOMA) 350 mg tablet Take 350 mg by mouth four (4) times daily.  acetaminophen (TYLENOL) 325 mg tablet Take  by mouth every four (4) hours as needed for Pain.  cetirizine (ZYRTEC) 10 mg tablet       hydroxyzine HCL (ATARAX) 10 mg tablet       DULoxetine (CYMBALTA) 60 mg capsule Take 60 mg by mouth daily.  oxyCODONE-acetaminophen (PERCOCET) 7.5-325 mg per tablet Take  by mouth every four (4) hours as needed.  pravastatin (PRAVACHOL) 10 mg tablet Take  by mouth nightly.  enoxaparin (LOVENOX) 40 mg/0.4 mL 0.4 mL by SubCUTAneous route every twenty-four (24) hours. Indications: treatment to prevent a blood clot in the lung 7 Syringe 0    hyoscyamine sulfate (CYSTOSPAZ) 0.125 mg tablet 1 Tab by SubLINGual route every four (4) hours as needed for Pain. 12 Tab 0    ondansetron (ZOFRAN ODT) 4 mg disintegrating tablet Take 1 Tab by mouth every eight (8) hours as needed for Nausea or Vomiting. Indications: prevent nausea and vomiting after surgery 15 Tab 0    metFORMIN (GLUCOPHAGE) 500 mg tablet Take 1 Tab by mouth two (2) times daily (with meals) for 30 days. 60 Tab 0    naloxone (NARCAN) 4 mg/actuation nasal spray Use 1 spray intranasally, then discard. Repeat with new spray every 2 min as needed for opioid overdose symptoms, alternating nostrils.  2 Each 0       Allergies   Allergen Reactions    Pcn [Penicillins] Other (comments)     hives    Penicillins Hives    Wellbutrin [Bupropion Hcl] Other (comments)     hives    Wellbutrin [Bupropion] Hives         Objective:     Visit Vitals  /81 (BP 1 Location: Right arm, BP Patient Position: Sitting)   Pulse 92   Temp 97.7 °F (36.5 °C)   Ht 5' 4\" (1.626 m)   Wt 131 kg (288 lb 12.8 oz)   SpO2 96%   BMI 49.57 kg/m²       General:  Alert, oriented x3, nontoxic in appearance   Chest: Clear to auscultation without adventitious sounds with equal and symmetric excursions bilaterally   Cor: Regular rate and rhythm without rub gallop or murmur   Abdomen: Soft with normoactive bowel sounds and no masses, megaly, wounds clean dry approximated with appropriate surrounding induration and incisional tenderness without evidence of infectious complications or incisional hernia       February 17, 2020 pathology: 1. Gallbladder-chronic calculus cholecystitis                                                   2.  Liver biopsy-steatohepatitis    Assessment:     History of super obesity status post laparoscopic gastric bypass February 17, 2020 with appropriate weight loss and comorbidity resolution    Plan:     Continue compliance with the early postoperative bariatric surgical diet with advancement as per bariatric nutrition. Continue compliance with the vitamin supplementation protocol, increase duration of exercise regimen to 30 minutes on daily basis, encourage monthly attendance at the bariatric support group meeting.   Follow-up May 2020 with labs for 3-month bariatric follow-up evaluation

## 2020-03-26 ENCOUNTER — DOCUMENTATION ONLY (OUTPATIENT)
Dept: SURGERY | Age: 45
End: 2020-03-26

## 2020-03-26 NOTE — PROGRESS NOTES
[facility]  POST-OP NUTRITION FOLLOW UP    Patient's Name: Zoey Garcia  YOB: 1975  Surgery Date: 02/17/2020      Procedure: LGBP    Surgeon: Trish Tolentino    Height: 5'4\"     Pre-Op Weight: 313     Current Weight: 273  Weight Lost: 40      Complications  Readmittance: no  Reoperations: no0  Complications: no  IV Fluids: no  ER Trips: no    Problem Areas:   Nausea: a few times   Vomiting:  A few times  Dumping Syndrome: no  Inadequate Protein: no  Inadequate Fluids: no  Food Intolerance: eggs are sometimes a challenge  Hunger: no  Constipation: yes on Miralax    Eating 3 Meals/Day:yes  Portion Size at Meals: 1-2     Protein from Food: 15 grams    Foods being consumed:  Breakfast: Time:8:00  yogurt  Lunch: Time: 12:00   Cheese   Dinner:  Time: 6:00   Chicken,   In-between eating:  Protein. Length of time for meals: 20-30    food/fluids: yes    Fluids: >64 oz/day   Types of Fluids: water. MVI: Laina    Number/Day: 2   Taken Separately: yes    Vitamin B1: 100 mg  Dosing: daily    Calcium: 500 mg     Calcium Dosing: daily    Taken Separately: yes    Vitamin B12: 1000 mcg   Vitamin B12 Dosing: daily    Vitamin D: 5000 iu     Vitamin D dosing: daily       Protein Supplement: premier and isopure     Grams of Protein: 30-40  Patient is taking 7 days a week. Exercise: walking. Comments:    Patient is doing well. Patient was educated on the importance of eating meat and vegetables only. I have talked with patient about the effects of carbohydrates, not only from a weight management perspective, but also what effects it could have on their blood sugar and what reactive hypoglycemia is. Diet Follow Up:  9 month class scheduled.     Farzana Diaz, MARCELL    3/26/2020

## 2020-08-18 ENCOUNTER — HOSPITAL ENCOUNTER (OUTPATIENT)
Dept: LAB | Age: 45
Discharge: HOME | End: 2020-08-18
Payer: OTHER GOVERNMENT

## 2020-08-18 DIAGNOSIS — K91.2 POSTOPERATIVE INTESTINAL MALABSORPTION: ICD-10-CM

## 2020-08-18 LAB
ALBUMIN SERPL-MCNC: 3.9 G/DL (ref 3.4–5)
BASOPHILS # BLD: 0 K/UL (ref 0–0.1)
BASOPHILS NFR BLD: 1 % (ref 0–2)
CHOLEST SERPL-MCNC: 209 MG/DL
DIFFERENTIAL METHOD BLD: NORMAL
EOSINOPHIL # BLD: 0.2 K/UL (ref 0–0.4)
EOSINOPHIL NFR BLD: 2 % (ref 0–5)
ERYTHROCYTE [DISTWIDTH] IN BLOOD BY AUTOMATED COUNT: 13.7 % (ref 11.6–14.5)
HBA1C MFR BLD: 5.4 % (ref 4.2–5.6)
HCT VFR BLD AUTO: 41.9 % (ref 35–45)
HDLC SERPL-MCNC: 66 MG/DL (ref 40–60)
HDLC SERPL: 3.2 {RATIO} (ref 0–5)
HGB BLD-MCNC: 13.9 G/DL (ref 12–16)
IRON SERPL-MCNC: 107 UG/DL (ref 50–175)
LDLC SERPL CALC-MCNC: 105.2 MG/DL (ref 0–100)
LIPID PROFILE,FLP: ABNORMAL
LYMPHOCYTES # BLD: 2.1 K/UL (ref 0.9–3.6)
LYMPHOCYTES NFR BLD: 31 % (ref 21–52)
MCH RBC QN AUTO: 30.8 PG (ref 24–34)
MCHC RBC AUTO-ENTMCNC: 33.2 G/DL (ref 31–37)
MCV RBC AUTO: 92.9 FL (ref 74–97)
MONOCYTES # BLD: 0.6 K/UL (ref 0.05–1.2)
MONOCYTES NFR BLD: 9 % (ref 3–10)
NEUTS SEG # BLD: 3.8 K/UL (ref 1.8–8)
NEUTS SEG NFR BLD: 57 % (ref 40–73)
PLATELET # BLD AUTO: 362 K/UL (ref 135–420)
PMV BLD AUTO: 10.5 FL (ref 9.2–11.8)
RBC # BLD AUTO: 4.51 M/UL (ref 4.2–5.3)
TRIGL SERPL-MCNC: 189 MG/DL (ref ?–150)
VLDLC SERPL CALC-MCNC: 37.8 MG/DL
WBC # BLD AUTO: 6.7 K/UL (ref 4.6–13.2)

## 2020-08-18 PROCEDURE — 80061 LIPID PANEL: CPT

## 2020-08-18 PROCEDURE — 84425 ASSAY OF VITAMIN B-1: CPT

## 2020-08-18 PROCEDURE — 83540 ASSAY OF IRON: CPT

## 2020-08-18 PROCEDURE — 36415 COLL VENOUS BLD VENIPUNCTURE: CPT

## 2020-08-18 PROCEDURE — 82040 ASSAY OF SERUM ALBUMIN: CPT

## 2020-08-18 PROCEDURE — 83036 HEMOGLOBIN GLYCOSYLATED A1C: CPT

## 2020-08-18 PROCEDURE — 85025 COMPLETE CBC W/AUTO DIFF WBC: CPT

## 2020-08-20 ENCOUNTER — OFFICE VISIT (OUTPATIENT)
Dept: SURGERY | Age: 45
End: 2020-08-20

## 2020-08-20 VITALS
DIASTOLIC BLOOD PRESSURE: 75 MMHG | SYSTOLIC BLOOD PRESSURE: 111 MMHG | BODY MASS INDEX: 37.39 KG/M2 | OXYGEN SATURATION: 95 % | HEART RATE: 90 BPM | TEMPERATURE: 97.5 F | WEIGHT: 219 LBS | HEIGHT: 64 IN

## 2020-08-20 DIAGNOSIS — K91.2 POSTOPERATIVE INTESTINAL MALABSORPTION: Primary | ICD-10-CM

## 2020-08-20 LAB — VIT B1 BLD-SCNC: 178.7 NMOL/L (ref 66.5–200)

## 2020-08-20 RX ORDER — LANOLIN ALCOHOL/MO/W.PET/CERES
1000 CREAM (GRAM) TOPICAL DAILY
COMMUNITY

## 2020-08-20 RX ORDER — CHOLECALCIFEROL TAB 125 MCG (5000 UNIT) 125 MCG
TAB ORAL DAILY
COMMUNITY

## 2020-08-20 RX ORDER — IBUPROFEN 200 MG
CAPSULE ORAL DAILY
COMMUNITY

## 2020-08-20 RX ORDER — LANOLIN ALCOHOL/MO/W.PET/CERES
CREAM (GRAM) TOPICAL DAILY
COMMUNITY

## 2020-08-20 NOTE — PROGRESS NOTES
Matias Brice is a 45 y.o. female (: 1975) presenting to address:    Chief Complaint   Patient presents with    Follow-up     LGBP 20       Medication list and allergies have been reviewed with Matias Brice and updated as of today's date. I have gone over all Medical, Surgical and Social History with Matias Brice and updated/added the information accordingly. 1. Have you been to the ER, Urgent Care or Hospitalized since your last visit? NO      2. Have you followed up with your PCP or any other Physicians since your procedure/ last office visit?    YES

## 2020-08-20 NOTE — PROGRESS NOTES
Bariatric Follow-Up Evaluation    France Burkitt Harrie Favorite is a 45 y.o. white female presents in follow-up status post laparoscopic cholecystectomy/gastric bypass February 17, 2020 for definitive management of her clinically severe obesity without complaints referable to her bariatric surgery. Tolerant of and compliant with a regular bariatric diet with appropriate early satiety no specific food intolerances or pathologic emesis. The patient is not attempted density carbohydrates and therefore is not experience dumping syndrome. Compliant with multivitamin, calcium citrate, vitamin B1, vitamin B12, and vitamin D supplementation  Exercise regimen: Walking 30 minutes daily  Comorbidities: Hypertension, prediabetes, obstructive sleep apnea-resolved                           Hypercholesterolemia- nonfasting specimen but elevated  Preoperative weight: 308  Current weight: 219 BMI: 37  Estimated weight loss: 142. Current weight loss: 89. Goal weight: 160. Percentage weight loss: 63% / 6 months    Weight Loss Metrics 8/20/2020 8/20/2020 3/3/2020 3/3/2020 2/17/2020 2/11/2020 1/31/2020   Pre op / Initial Wt 308 - 313 - - - -   Today's Wt - 219 lb - 288 lb 12.8 oz - 313 lb 308 lb 6.4 oz   BMI - 37.59 kg/m2 - 49.57 kg/m2 53.73 kg/m2 - 52.94 kg/m2   Ideal Body Wt 131 - 131 - - - -   Excess Body Wt 177 - 182 - - - -   Goal Wt 160 - 167 - - - -   Wt loss to date 89 - 24.2 - - - -   % Wt Loss 0.63 - 0.17 - - - -   80% .6 - 145.6 - - - -       Allergies   Allergen Reactions    Pcn [Penicillins] Other (comments)     hives    Penicillins Hives    Wellbutrin [Bupropion Hcl] Other (comments)     hives    Wellbutrin [Bupropion] Hives       Current Outpatient Medications on File Prior to Visit   Medication Sig Dispense Refill    carisoprodoL (SOMA) 350 mg tablet Take 350 mg by mouth four (4) times daily.  DULoxetine (CYMBALTA) 60 mg capsule Take 60 mg by mouth daily.       oxyCODONE-acetaminophen (PERCOCET) 7.5-325 mg per tablet Take  by mouth every four (4) hours as needed.  acetaminophen (TYLENOL) 325 mg tablet Take  by mouth every four (4) hours as needed for Pain.  enoxaparin (LOVENOX) 40 mg/0.4 mL 0.4 mL by SubCUTAneous route every twenty-four (24) hours. Indications: treatment to prevent a blood clot in the lung 7 Syringe 0    hyoscyamine sulfate (CYSTOSPAZ) 0.125 mg tablet 1 Tab by SubLINGual route every four (4) hours as needed for Pain. 12 Tab 0    ondansetron (ZOFRAN ODT) 4 mg disintegrating tablet Take 1 Tab by mouth every eight (8) hours as needed for Nausea or Vomiting. Indications: prevent nausea and vomiting after surgery 15 Tab 0    naloxone (NARCAN) 4 mg/actuation nasal spray Use 1 spray intranasally, then discard. Repeat with new spray every 2 min as needed for opioid overdose symptoms, alternating nostrils. 2 Each 0    cetirizine (ZYRTEC) 10 mg tablet       hydroxyzine HCL (ATARAX) 10 mg tablet       pravastatin (PRAVACHOL) 10 mg tablet Take  by mouth nightly. No current facility-administered medications on file prior to visit. Past Medical History:   Diagnosis Date    Anxiety     Blurred vision, bilateral     Burning with urination     Chest pain     Coughing up blood     one episode    Depression     Diabetes ([address]     Just diagnosed.   Not on any medicines    Diarrhea     Ear ache     Fibromyalgia     Headache     Heartburn     Hypercholesteremia     Hypertension     Joint pain     Muscle ache     Pelvic pain     Pre-diabetes     Recent change in weight     Sarcoidosis of skin     Sleep apnea     on bipap    Tiredness     Trouble in sleeping        Past Surgical History:   Procedure Laterality Date    AMB POC SKIN BIOPSY      HX TONSILLECTOMY         Social History     Tobacco Use    Smoking status: Former Smoker    Smokeless tobacco: Never Used    Tobacco comment: 2010   Substance Use Topics    Alcohol use: Not Currently     Comment: ocass    Drug use: Never       Family History   Problem Relation Age of Onset    Diabetes Maternal Grandmother     Heart Disease Maternal Grandmother     Hypertension Maternal Grandmother     Stroke Maternal Grandmother     Diabetes Paternal Grandmother     Diabetes Paternal Grandfather     Heart Disease Paternal Grandfather     Hypertension Paternal Grandfather     Stroke Paternal Grandfather     Heart Disease Mother        ROS:  General: Negative for fevers, chills, night sweats, fatigue, weight loss, or weight gain. GI: Negative for abdominal pain, change in bowel habits, hematochezia, melena, or GERD. Integumentary: Negative for dermatitis or abnormal moles. HEENT: Negative for visual changes, vertigo, epistaxis, dysphagia, or hoarseness    Cardiac: Negative for chest pain, palpitations, hypertension, edema, or varicosities    Resp: Negative for cough, shortness of breath, wheezing, hemoptysis, snoring, or reactive airway disease    : Negative for hematuria, dysuria, frequency, urgency, nocturia, or stress urinary incontinence    MSK:  Negative for weakness, joint pain, or arthritis    Endocrine: Negative for diabetes, thyroid disease, polyuria, polydipsia, polyphagia, poor wound, heat intolerance, or cold intolerance. Lymph/Heme: Negative for anemia, bruising, or history of blood transfusions. Neuro:  Negative for dizziness, headache, fainting, seizures, and stroke. Psychiatry:  Negative for anxiety or depression    Physical Exam    Visit Vitals  /75 (BP 1 Location: Right arm, BP Patient Position: Sitting)   Pulse 90   Temp 97.5 °F (36.4 °C)   Ht 5' 4\" (1.626 m)   Wt 99.3 kg (219 lb)   SpO2 95%   BMI 37.59 kg/m²       Nursing note reviewed. General: 45 y.o. female is in no acute distress. Resp: Clear to auscultation bilaterally, no wheezing, rhonchi, or rales, excursions normal and symmetrical.  Cardio: Regular rate and rhythm, no murmurs, clicks, gallops, or rubs.  No edema or varicosities. Abdomen: Obese, soft, nontender, nondistended, normoactive bowel sounds, no hernias, no hepatosplenomegaly,   Psych: Alert and oriented to person, place, and time. August 18, 2020 CBC, albumin, iron, cholesterol panel, hemoglobin A1c, vitamin B1         Nonfasting cholesterol 209, nonfasting triglycerides 189, vitamin B1 not yet completed with remainder laboratory values within normal parameters         Iron, vitamin B12 not obtained    Impression      History of clinically severe obesity status post lap scopic gastric bypass February 17, 2020 with appropriate weight loss and comorbidity resolution      Plan    Formal bariatric nutrition follow-up 9 months postop  Continue compliance with bariatric surgical diet, exercise regimen, vitamin supplementation protocol, encourage monthly attendance at the bariatric support group meetings.   Follow-up February 17, 2020 with fasting annual labs for ongoing annual bariatric follow-up

## 2020-11-19 ENCOUNTER — HOSPITAL ENCOUNTER (OUTPATIENT)
Dept: BARIATRICS/WEIGHT MGMT | Age: 45
Discharge: HOME | End: 2020-11-19

## 2021-01-06 ENCOUNTER — DOCUMENTATION ONLY (OUTPATIENT)
Dept: SURGERY | Age: 46
End: 2021-01-06

## 2021-01-06 NOTE — PROGRESS NOTES
Per [facility] requirements;  E-mail and letter sent for follow up appointment. [facility]  C/Guillermo Reynoso. [address] [facility] [place]   [employer] [facility]  [facility]        Dear Patient,        Your health is our main concern. It is important for your health to have follow-up lab work and to see your surgeon at 3 months, 6 months and annually after your weight loss surgery. Additionally, the Department of Bariatric Surgery at our hospital is a member of the [employer] of [address] [place] [address] Surgical Quality Improvement Program ([facility] NSQIP). As a participant in this program, we gather information on the outcomes of our patients after surgery. Please call the office for a follow up appointment at [phone]. If you have moved out of the area or have changed surgeons please call us and let us know the name of your doctor. Your health and feedback are important to us. We greatly appreciate your response.        Thank you,  [employer] [facility] [address] [facility]

## 2022-01-05 ENCOUNTER — DOCUMENTATION ONLY (OUTPATIENT)
Dept: SURGERY | Age: 47
End: 2022-01-05

## 2022-01-05 NOTE — PROGRESS NOTES
Per [facility] requirements;  E-mail and letter sent for follow up appointment. [address] [facility] [place] [address]      Dear Patient,    Your health is our main concern. It is important for your health to have follow-up lab work and to see your surgeon at 3 months, 6 months and annually after your weight loss surgery. Additionally, the Department of bariatric Surgery at our hospital is a member of the Metabolic and Bariatric Surgery Accreditation and Quality Improvement Program [facility]). As a participant in this program, we gather information on the outcomes of our patients after surgery. Please call the office for a follow up appointment at [phone] [facility]) or [phone] [facility]). If you have moved out of the area or have changed surgeons please call us and let us know the name of your doctor. Your health and feedback are important to us. We greatly appreciate your response.        Thank you,  [address] [facility]

## 2022-03-19 PROBLEM — E66.01 OBESITY, MORBID (HCC): Status: ACTIVE | Noted: 2019-10-08

## 2022-03-20 PROBLEM — E66.01 MORBID OBESITY WITH BMI OF 50.0-59.9, ADULT (HCC): Status: ACTIVE | Noted: 2020-02-17

## 2022-07-01 ENCOUNTER — TRANSCRIBE ORDER (OUTPATIENT)
Dept: SCHEDULING | Age: 47
End: 2022-07-01

## 2022-07-01 DIAGNOSIS — Z12.31 ENCOUNTER FOR SCREENING MAMMOGRAM FOR MALIGNANT NEOPLASM OF BREAST: Primary | ICD-10-CM

## 2023-01-31 DIAGNOSIS — Z12.31 ENCOUNTER FOR SCREENING MAMMOGRAM FOR MALIGNANT NEOPLASM OF BREAST: Primary | ICD-10-CM

## 2023-02-05 DIAGNOSIS — Z12.31 ENCOUNTER FOR SCREENING MAMMOGRAM FOR MALIGNANT NEOPLASM OF BREAST: Primary | ICD-10-CM

## (undated) DEVICE — RELOAD STPL L60MM H1.5-3.6MM REG TISS BLU GRIPPING SURF B

## (undated) DEVICE — STAPLER SKIN L440MM 32MM LNG 12 FIRING B FRM PWR + GRIPPING

## (undated) DEVICE — SUTURE VCRL SZ 3-0 L27IN ABSRB VLT L26MM SH 1/2 CIR J316H

## (undated) DEVICE — TROCAR ENDOSCP SHFT L100MM DIA12MM INTEGR STBL ENDOPATH

## (undated) DEVICE — KIT CATH OD16FR 5ML BLLN SIL URIN INDWL STR TIP INF CTRL

## (undated) DEVICE — RELOAD STPL L60MM H1-2.6MM MESENTERY THN TISS WHT 6 ROW

## (undated) DEVICE — SUT SLK 2-0SH 30IN BLK --

## (undated) DEVICE — LIGHT HANDLE: Brand: DEVON

## (undated) DEVICE — TOWEL SURG W16XL26IN BLU NONFENESTRATED DLX ST 2 PER PK

## (undated) DEVICE — SHEARS: Brand: ENDO SHEARS

## (undated) DEVICE — STAPLE INT WHT BLU G GRN BLK REINF FOR ENDOPATH ECHELON FLX

## (undated) DEVICE — TRUE CONTENT TO BE POPULATED AS PART OF REBRANDING: Brand: ARGYLE

## (undated) DEVICE — SOL IRR STRL H2O 500ML STRL --

## (undated) DEVICE — SUTURE MCRYL SZ 4-0 L27IN ABSRB UD L24MM PS-1 3/8 CIR PRIM Y935H

## (undated) DEVICE — TROCAR LAP L100MM DIA5MM BLDELSS W/ STBL SL ENDOPATH XCEL

## (undated) DEVICE — SHEAR HARMONIC ACET 5MMX36CM -- ACE PLUS

## (undated) DEVICE — TROCAR ENDOSCP BLDELSS 12X100 MM W/ HNDL STBL SL OPT TIP

## (undated) DEVICE — SUTURE VCRL SZ 2-0 L54IN ABSRB VLT W/O NDL POLYGLACTIN 910 J618H

## (undated) DEVICE — DISPOSABLE SUCTION/IRRIGATOR TUBE SET WITH TIP: Brand: AHTO

## (undated) DEVICE — SLEEVE TRCR L100MM DIA5MM UNIV STBL FOR BLDELSS DIL TIP

## (undated) DEVICE — PREP SKN CHLORHEX GLUC 8OZ --

## (undated) DEVICE — REM POLYHESIVE ADULT PATIENT RETURN ELECTRODE: Brand: VALLEYLAB

## (undated) DEVICE — KENDALL SCD EXPRESS SLEEVES, KNEE LENGTH, MEDIUM: Brand: KENDALL SCD

## (undated) DEVICE — STERILE POLYISOPRENE POWDER-FREE SURGICAL GLOVES: Brand: PROTEXIS

## (undated) DEVICE — SHEARS RMFG AUTOSUTURE ENDO --

## (undated) DEVICE — PACK PROCEDURE SURG LAPAROSCOPY 17X7 MM BRTRC PRIMUS